# Patient Record
Sex: FEMALE | Race: ASIAN | NOT HISPANIC OR LATINO | URBAN - METROPOLITAN AREA
[De-identification: names, ages, dates, MRNs, and addresses within clinical notes are randomized per-mention and may not be internally consistent; named-entity substitution may affect disease eponyms.]

---

## 2017-06-06 DIAGNOSIS — G89.29 PAIN IN LEFT WRIST: ICD-10-CM

## 2017-06-06 DIAGNOSIS — M25.532 PAIN IN LEFT WRIST: ICD-10-CM

## 2017-06-14 ENCOUNTER — OUTPATIENT (OUTPATIENT)
Dept: OUTPATIENT SERVICES | Facility: HOSPITAL | Age: 27
LOS: 1 days | End: 2017-06-14
Payer: MEDICAID

## 2017-06-14 ENCOUNTER — APPOINTMENT (OUTPATIENT)
Dept: ORTHOPEDIC SURGERY | Facility: HOSPITAL | Age: 27
End: 2017-06-14

## 2017-06-14 ENCOUNTER — APPOINTMENT (OUTPATIENT)
Dept: RADIOLOGY | Facility: HOSPITAL | Age: 27
End: 2017-06-14

## 2017-06-14 VITALS
HEART RATE: 68 BPM | HEIGHT: 61 IN | SYSTOLIC BLOOD PRESSURE: 115 MMHG | DIASTOLIC BLOOD PRESSURE: 56 MMHG | WEIGHT: 125.88 LBS | BODY MASS INDEX: 23.77 KG/M2

## 2017-06-14 PROCEDURE — 73110 X-RAY EXAM OF WRIST: CPT | Mod: 26,LT

## 2017-06-15 DIAGNOSIS — M65.4 RADIAL STYLOID TENOSYNOVITIS [DE QUERVAIN]: ICD-10-CM

## 2017-07-19 ENCOUNTER — APPOINTMENT (OUTPATIENT)
Dept: ORTHOPEDIC SURGERY | Facility: HOSPITAL | Age: 27
End: 2017-07-19

## 2017-08-22 ENCOUNTER — RESULT REVIEW (OUTPATIENT)
Age: 27
End: 2017-08-22

## 2017-10-25 ENCOUNTER — RESULT REVIEW (OUTPATIENT)
Age: 27
End: 2017-10-25

## 2017-12-27 ENCOUNTER — OUTPATIENT (OUTPATIENT)
Dept: OUTPATIENT SERVICES | Facility: HOSPITAL | Age: 27
LOS: 1 days | End: 2017-12-27

## 2017-12-27 ENCOUNTER — APPOINTMENT (OUTPATIENT)
Dept: ORTHOPEDIC SURGERY | Facility: HOSPITAL | Age: 27
End: 2017-12-27

## 2017-12-27 VITALS
WEIGHT: 123 LBS | DIASTOLIC BLOOD PRESSURE: 68 MMHG | BODY MASS INDEX: 23.22 KG/M2 | SYSTOLIC BLOOD PRESSURE: 118 MMHG | HEART RATE: 67 BPM | HEIGHT: 61 IN

## 2017-12-27 DIAGNOSIS — M65.4 RADIAL STYLOID TENOSYNOVITIS [DE QUERVAIN]: ICD-10-CM

## 2018-07-24 ENCOUNTER — APPOINTMENT (OUTPATIENT)
Dept: ORTHOPEDIC SURGERY | Facility: CLINIC | Age: 28
End: 2018-07-24
Payer: MEDICAID

## 2018-07-24 VITALS
SYSTOLIC BLOOD PRESSURE: 89 MMHG | HEART RATE: 97 BPM | WEIGHT: 120 LBS | BODY MASS INDEX: 22.66 KG/M2 | HEIGHT: 61 IN | DIASTOLIC BLOOD PRESSURE: 64 MMHG

## 2018-07-24 PROCEDURE — 99203 OFFICE O/P NEW LOW 30 MIN: CPT

## 2018-08-14 ENCOUNTER — OUTPATIENT (OUTPATIENT)
Dept: OUTPATIENT SERVICES | Facility: HOSPITAL | Age: 28
LOS: 1 days | End: 2018-08-14
Payer: MEDICAID

## 2018-08-14 VITALS
SYSTOLIC BLOOD PRESSURE: 100 MMHG | RESPIRATION RATE: 16 BRPM | TEMPERATURE: 98 F | HEIGHT: 61 IN | DIASTOLIC BLOOD PRESSURE: 60 MMHG | HEART RATE: 71 BPM | WEIGHT: 119.93 LBS | OXYGEN SATURATION: 99 %

## 2018-08-14 DIAGNOSIS — M65.4 RADIAL STYLOID TENOSYNOVITIS [DE QUERVAIN]: ICD-10-CM

## 2018-08-14 DIAGNOSIS — Z01.818 ENCOUNTER FOR OTHER PREPROCEDURAL EXAMINATION: ICD-10-CM

## 2018-08-14 LAB
HCT VFR BLD CALC: 37.6 % — SIGNIFICANT CHANGE UP (ref 34.5–45)
HGB BLD-MCNC: 12.7 G/DL — SIGNIFICANT CHANGE UP (ref 11.5–15.5)
MCHC RBC-ENTMCNC: 28.7 PG — SIGNIFICANT CHANGE UP (ref 27–34)
MCHC RBC-ENTMCNC: 33.8 GM/DL — SIGNIFICANT CHANGE UP (ref 32–36)
MCV RBC AUTO: 85.1 FL — SIGNIFICANT CHANGE UP (ref 80–100)
PLATELET # BLD AUTO: 296 K/UL — SIGNIFICANT CHANGE UP (ref 150–400)
RBC # BLD: 4.42 M/UL — SIGNIFICANT CHANGE UP (ref 3.8–5.2)
RBC # FLD: 12.9 % — SIGNIFICANT CHANGE UP (ref 10.3–14.5)
WBC # BLD: 6.15 K/UL — SIGNIFICANT CHANGE UP (ref 3.8–10.5)
WBC # FLD AUTO: 6.15 K/UL — SIGNIFICANT CHANGE UP (ref 3.8–10.5)

## 2018-08-14 PROCEDURE — 85027 COMPLETE CBC AUTOMATED: CPT

## 2018-08-14 PROCEDURE — G0463: CPT

## 2018-08-14 RX ORDER — SODIUM CHLORIDE 9 MG/ML
3 INJECTION INTRAMUSCULAR; INTRAVENOUS; SUBCUTANEOUS EVERY 8 HOURS
Qty: 0 | Refills: 0 | Status: DISCONTINUED | OUTPATIENT
Start: 2018-08-17 | End: 2018-09-01

## 2018-08-14 RX ORDER — LIDOCAINE HCL 20 MG/ML
0.2 VIAL (ML) INJECTION ONCE
Qty: 0 | Refills: 0 | Status: DISCONTINUED | OUTPATIENT
Start: 2018-08-17 | End: 2018-09-01

## 2018-08-14 NOTE — H&P PST ADULT - PROBLEM SELECTOR PLAN 1
Left DeQuervains release ( Spoke to Dr Agee OR booking person Bernadette and she is going to check with Dr Agee , pt has a cyst on left wrist and pt wants it removed. Bernadette will call back with plan.   Check CBC, UCG DOS Left DeQuervains release ( Spoke to Dr Agee OR booking person Bernadette and she is going to check with Dr Agee , pt has a cyst on left wrist and pt wants it removed. Bernadette will call back with plan.   Bernadette spoke to Dr Agee and the DeQuervains release will also remove the cyst in pt left wrist.     Check CBC, UCG DOS

## 2018-08-14 NOTE — H&P PST ADULT - NSANTHOSAYNRD_GEN_A_CORE
No. SHARON screening performed.  STOP BANG Legend: 0-2 = LOW Risk; 3-4 = INTERMEDIATE Risk; 5-8 = HIGH Risk

## 2018-08-14 NOTE — H&P PST ADULT - HISTORY OF PRESENT ILLNESS
28 y/O female C/O left wrist pain since 6/2017, pt received 2 injections by Dr Colin  with relief for 6 months. Seen by MD. S/P x-ray left wrist.  Presents Left DeQuervain release 8/17/18.

## 2018-08-16 ENCOUNTER — TRANSCRIPTION ENCOUNTER (OUTPATIENT)
Age: 28
End: 2018-08-16

## 2018-08-16 RX ORDER — ONDANSETRON 8 MG/1
4 TABLET, FILM COATED ORAL ONCE
Qty: 0 | Refills: 0 | Status: DISCONTINUED | OUTPATIENT
Start: 2018-08-17 | End: 2018-09-01

## 2018-08-16 RX ORDER — SODIUM CHLORIDE 9 MG/ML
1000 INJECTION, SOLUTION INTRAVENOUS
Qty: 0 | Refills: 0 | Status: DISCONTINUED | OUTPATIENT
Start: 2018-08-17 | End: 2018-09-01

## 2018-08-17 ENCOUNTER — APPOINTMENT (OUTPATIENT)
Dept: ORTHOPEDIC SURGERY | Facility: HOSPITAL | Age: 28
End: 2018-08-17

## 2018-08-17 ENCOUNTER — OUTPATIENT (OUTPATIENT)
Dept: OUTPATIENT SERVICES | Facility: HOSPITAL | Age: 28
LOS: 1 days | End: 2018-08-17
Payer: MEDICAID

## 2018-08-17 VITALS
WEIGHT: 119.93 LBS | SYSTOLIC BLOOD PRESSURE: 107 MMHG | OXYGEN SATURATION: 100 % | HEIGHT: 60.98 IN | RESPIRATION RATE: 16 BRPM | DIASTOLIC BLOOD PRESSURE: 73 MMHG | HEART RATE: 81 BPM | TEMPERATURE: 98 F

## 2018-08-17 VITALS
TEMPERATURE: 98 F | HEART RATE: 74 BPM | OXYGEN SATURATION: 100 % | SYSTOLIC BLOOD PRESSURE: 106 MMHG | RESPIRATION RATE: 16 BRPM | DIASTOLIC BLOOD PRESSURE: 63 MMHG

## 2018-08-17 DIAGNOSIS — M65.4 RADIAL STYLOID TENOSYNOVITIS [DE QUERVAIN]: ICD-10-CM

## 2018-08-17 PROCEDURE — 25000 INCISION OF TENDON SHEATH: CPT | Mod: LT

## 2018-08-17 NOTE — ASU DISCHARGE PLAN (ADULT/PEDIATRIC). - NOTIFY
Bleeding that does not stop/Unable to Urinate/GYN Fever>100.4/Swelling that continues/Persistent Nausea and Vomiting

## 2018-08-28 ENCOUNTER — APPOINTMENT (OUTPATIENT)
Dept: ORTHOPEDIC SURGERY | Facility: CLINIC | Age: 28
End: 2018-08-28
Payer: MEDICAID

## 2018-08-28 PROCEDURE — 99024 POSTOP FOLLOW-UP VISIT: CPT

## 2021-03-05 PROBLEM — M65.4 RADIAL STYLOID TENOSYNOVITIS [DE QUERVAIN]: Chronic | Status: ACTIVE | Noted: 2018-08-14

## 2021-03-15 ENCOUNTER — LABORATORY RESULT (OUTPATIENT)
Age: 31
End: 2021-03-15

## 2021-03-15 ENCOUNTER — APPOINTMENT (OUTPATIENT)
Dept: OBGYN | Facility: CLINIC | Age: 31
End: 2021-03-15
Payer: MEDICAID

## 2021-03-15 ENCOUNTER — ASOB RESULT (OUTPATIENT)
Age: 31
End: 2021-03-15

## 2021-03-15 VITALS
TEMPERATURE: 97.6 F | WEIGHT: 113 LBS | BODY MASS INDEX: 21.34 KG/M2 | SYSTOLIC BLOOD PRESSURE: 110 MMHG | HEART RATE: 68 BPM | HEIGHT: 61 IN | DIASTOLIC BLOOD PRESSURE: 64 MMHG

## 2021-03-15 DIAGNOSIS — A74.9 CHLAMYDIAL INFECTION, UNSPECIFIED: ICD-10-CM

## 2021-03-15 DIAGNOSIS — Z80.41 FAMILY HISTORY OF MALIGNANT NEOPLASM OF OVARY: ICD-10-CM

## 2021-03-15 PROCEDURE — 76817 TRANSVAGINAL US OBSTETRIC: CPT

## 2021-03-15 PROCEDURE — 99072 ADDL SUPL MATRL&STAF TM PHE: CPT

## 2021-03-15 PROCEDURE — 99204 OFFICE O/P NEW MOD 45 MIN: CPT

## 2021-03-15 RX ORDER — HYDROCODONE BITARTRATE AND ACETAMINOPHEN 5; 325 MG/1; MG/1
5-325 TABLET ORAL
Qty: 5 | Refills: 0 | Status: DISCONTINUED | COMMUNITY
Start: 2018-08-14 | End: 2021-03-15

## 2021-03-16 LAB
ABO + RH PNL BLD: NORMAL
BASOPHILS # BLD AUTO: 0.04 K/UL
BASOPHILS NFR BLD AUTO: 2.1 %
BLD GP AB SCN SERPL QL: NORMAL
C TRACH RRNA SPEC QL NAA+PROBE: NOT DETECTED
COVID-19 NUCLEOCAPSID  GAM ANTIBODY INTERPRETATION: NEGATIVE
EOSINOPHIL # BLD AUTO: 0.08 K/UL
EOSINOPHIL NFR BLD AUTO: 4.2 %
HBV SURFACE AG SER QL: NONREACTIVE
HCT VFR BLD CALC: 36.1 %
HCV AB SER QL: NONREACTIVE
HCV S/CO RATIO: 0.06 S/CO
HGB A MFR BLD: 97.2 %
HGB A2 MFR BLD: 2.8 %
HGB BLD-MCNC: 11.6 G/DL
HGB FRACT BLD-IMP: NORMAL
HIV1+2 AB SPEC QL IA.RAPID: NONREACTIVE
IMM GRANULOCYTES NFR BLD AUTO: 0 %
LYMPHOCYTES # BLD AUTO: 0.75 K/UL
LYMPHOCYTES NFR BLD AUTO: 39.7 %
MAN DIFF?: NORMAL
MCHC RBC-ENTMCNC: 28.4 PG
MCHC RBC-ENTMCNC: 32.1 GM/DL
MCV RBC AUTO: 88.5 FL
MEV IGG FLD QL IA: 204 AU/ML
MEV IGG+IGM SER-IMP: POSITIVE
MONOCYTES # BLD AUTO: 0.36 K/UL
MONOCYTES NFR BLD AUTO: 19 %
N GONORRHOEA RRNA SPEC QL NAA+PROBE: NOT DETECTED
NEUTROPHILS # BLD AUTO: 0.66 K/UL
NEUTROPHILS NFR BLD AUTO: 35 %
PLATELET # BLD AUTO: 257 K/UL
RBC # BLD: 4.08 M/UL
RBC # FLD: 12.6 %
RUBV IGG FLD-ACNC: 1.1 INDEX
RUBV IGG SER-IMP: POSITIVE
SARS-COV-2 AB SERPL QL IA: 0.08 INDEX
SOURCE AMPLIFICATION: NORMAL
T GONDII AB SER-IMP: NEGATIVE
T GONDII AB SER-IMP: NEGATIVE
T GONDII IGG SER QL: <3 IU/ML
T GONDII IGM SER QL: <3 AU/ML
T PALLIDUM AB SER QL IA: NEGATIVE
VZV AB TITR SER: NEGATIVE
VZV IGG SER IF-ACNC: 72.1 INDEX
WBC # FLD AUTO: 1.89 K/UL

## 2021-03-17 LAB
B19V IGG SER QL IA: 8.46 INDEX
B19V IGG+IGM SER-IMP: NORMAL
B19V IGG+IGM SER-IMP: POSITIVE
B19V IGM FLD-ACNC: 0.12 INDEX
B19V IGM SER-ACNC: NEGATIVE
BACTERIA UR CULT: NORMAL
LEAD BLD-MCNC: <1 UG/DL

## 2021-03-20 LAB — AR GENE MUT ANL BLD/T: NORMAL

## 2021-03-23 LAB — FMR1 GENE MUT ANL BLD/T: NORMAL

## 2021-04-12 ENCOUNTER — APPOINTMENT (OUTPATIENT)
Dept: OBGYN | Facility: CLINIC | Age: 31
End: 2021-04-12
Payer: MEDICAID

## 2021-04-12 ENCOUNTER — NON-APPOINTMENT (OUTPATIENT)
Age: 31
End: 2021-04-12

## 2021-04-12 VITALS
TEMPERATURE: 97.3 F | HEIGHT: 61 IN | DIASTOLIC BLOOD PRESSURE: 66 MMHG | WEIGHT: 114 LBS | BODY MASS INDEX: 21.52 KG/M2 | SYSTOLIC BLOOD PRESSURE: 115 MMHG

## 2021-04-12 PROCEDURE — 0501F PRENATAL FLOW SHEET: CPT

## 2021-04-27 ENCOUNTER — NON-APPOINTMENT (OUTPATIENT)
Age: 31
End: 2021-04-27

## 2021-04-28 ENCOUNTER — LABORATORY RESULT (OUTPATIENT)
Age: 31
End: 2021-04-28

## 2021-04-28 ENCOUNTER — ASOB RESULT (OUTPATIENT)
Age: 31
End: 2021-04-28

## 2021-04-28 ENCOUNTER — APPOINTMENT (OUTPATIENT)
Dept: ANTEPARTUM | Facility: CLINIC | Age: 31
End: 2021-04-28
Payer: MEDICAID

## 2021-04-28 PROCEDURE — 76813 OB US NUCHAL MEAS 1 GEST: CPT

## 2021-04-28 PROCEDURE — 76801 OB US < 14 WKS SINGLE FETUS: CPT

## 2021-04-28 PROCEDURE — 99072 ADDL SUPL MATRL&STAF TM PHE: CPT

## 2021-04-28 PROCEDURE — 36416 COLLJ CAPILLARY BLOOD SPEC: CPT

## 2021-04-30 LAB
CFTR MUT TESTED BLD/T: NEGATIVE
CYTOLOGY CVX/VAG DOC THIN PREP: NORMAL

## 2021-05-03 ENCOUNTER — NON-APPOINTMENT (OUTPATIENT)
Age: 31
End: 2021-05-03

## 2021-05-04 ENCOUNTER — NON-APPOINTMENT (OUTPATIENT)
Age: 31
End: 2021-05-04

## 2021-05-06 ENCOUNTER — APPOINTMENT (OUTPATIENT)
Dept: OBGYN | Facility: CLINIC | Age: 31
End: 2021-05-06
Payer: MEDICAID

## 2021-05-06 ENCOUNTER — NON-APPOINTMENT (OUTPATIENT)
Age: 31
End: 2021-05-06

## 2021-05-06 PROCEDURE — XXXXX: CPT

## 2021-05-20 ENCOUNTER — NON-APPOINTMENT (OUTPATIENT)
Age: 31
End: 2021-05-20

## 2021-05-20 ENCOUNTER — APPOINTMENT (OUTPATIENT)
Dept: OBGYN | Facility: CLINIC | Age: 31
End: 2021-05-20
Payer: MEDICAID

## 2021-05-20 VITALS
BODY MASS INDEX: 22.47 KG/M2 | WEIGHT: 119 LBS | TEMPERATURE: 98 F | HEIGHT: 61 IN | SYSTOLIC BLOOD PRESSURE: 118 MMHG | DIASTOLIC BLOOD PRESSURE: 63 MMHG

## 2021-05-20 PROCEDURE — 0502F SUBSEQUENT PRENATAL CARE: CPT

## 2021-05-21 ENCOUNTER — NON-APPOINTMENT (OUTPATIENT)
Age: 31
End: 2021-05-21

## 2021-05-21 LAB
BASOPHILS # BLD AUTO: 0.05 K/UL
BASOPHILS NFR BLD AUTO: 0.6 %
EOSINOPHIL # BLD AUTO: 0.23 K/UL
EOSINOPHIL NFR BLD AUTO: 2.9 %
HCT VFR BLD CALC: 32.2 %
HGB BLD-MCNC: 10.9 G/DL
IMM GRANULOCYTES NFR BLD AUTO: 0.4 %
LYMPHOCYTES # BLD AUTO: 1.64 K/UL
LYMPHOCYTES NFR BLD AUTO: 20.3 %
MAN DIFF?: NORMAL
MCHC RBC-ENTMCNC: 29.6 PG
MCHC RBC-ENTMCNC: 33.9 GM/DL
MCV RBC AUTO: 87.5 FL
MONOCYTES # BLD AUTO: 0.47 K/UL
MONOCYTES NFR BLD AUTO: 5.8 %
NEUTROPHILS # BLD AUTO: 5.65 K/UL
NEUTROPHILS NFR BLD AUTO: 70 %
PLATELET # BLD AUTO: 302 K/UL
RBC # BLD: 3.68 M/UL
RBC # FLD: 12.7 %
WBC # FLD AUTO: 8.07 K/UL

## 2021-05-26 LAB
2ND TRIMESTER DATA: NORMAL
AFP PNL SERPL: NORMAL
AFP SERPL-ACNC: NORMAL
CLINICAL BIOCHEMIST REVIEW: NORMAL
NOTES NTD: NORMAL

## 2021-06-24 ENCOUNTER — APPOINTMENT (OUTPATIENT)
Dept: OBGYN | Facility: CLINIC | Age: 31
End: 2021-06-24
Payer: MEDICAID

## 2021-06-24 ENCOUNTER — APPOINTMENT (OUTPATIENT)
Dept: ANTEPARTUM | Facility: CLINIC | Age: 31
End: 2021-06-24
Payer: MEDICAID

## 2021-06-24 ENCOUNTER — NON-APPOINTMENT (OUTPATIENT)
Age: 31
End: 2021-06-24

## 2021-06-24 ENCOUNTER — ASOB RESULT (OUTPATIENT)
Age: 31
End: 2021-06-24

## 2021-06-24 VITALS
WEIGHT: 126.1 LBS | SYSTOLIC BLOOD PRESSURE: 98 MMHG | DIASTOLIC BLOOD PRESSURE: 63 MMHG | HEART RATE: 66 BPM | BODY MASS INDEX: 23.81 KG/M2 | HEIGHT: 61 IN

## 2021-06-24 VITALS
HEART RATE: 66 BPM | HEIGHT: 61 IN | DIASTOLIC BLOOD PRESSURE: 63 MMHG | SYSTOLIC BLOOD PRESSURE: 98 MMHG | WEIGHT: 66 LBS | BODY MASS INDEX: 12.46 KG/M2

## 2021-06-24 PROCEDURE — 76811 OB US DETAILED SNGL FETUS: CPT

## 2021-06-24 PROCEDURE — 0502F SUBSEQUENT PRENATAL CARE: CPT

## 2021-06-30 ENCOUNTER — NON-APPOINTMENT (OUTPATIENT)
Age: 31
End: 2021-06-30

## 2021-07-06 ENCOUNTER — APPOINTMENT (OUTPATIENT)
Dept: ANTEPARTUM | Facility: CLINIC | Age: 31
End: 2021-07-06

## 2021-07-12 ENCOUNTER — ASOB RESULT (OUTPATIENT)
Age: 31
End: 2021-07-12

## 2021-07-12 ENCOUNTER — APPOINTMENT (OUTPATIENT)
Dept: ANTEPARTUM | Facility: CLINIC | Age: 31
End: 2021-07-12
Payer: MEDICAID

## 2021-07-12 ENCOUNTER — NON-APPOINTMENT (OUTPATIENT)
Age: 31
End: 2021-07-12

## 2021-07-12 PROCEDURE — 76816 OB US FOLLOW-UP PER FETUS: CPT

## 2021-07-29 ENCOUNTER — APPOINTMENT (OUTPATIENT)
Dept: OBGYN | Facility: CLINIC | Age: 31
End: 2021-07-29
Payer: MEDICAID

## 2021-07-29 VITALS
DIASTOLIC BLOOD PRESSURE: 67 MMHG | HEART RATE: 73 BPM | SYSTOLIC BLOOD PRESSURE: 104 MMHG | HEIGHT: 61 IN | BODY MASS INDEX: 24.94 KG/M2 | WEIGHT: 132.1 LBS

## 2021-07-29 LAB
BASOPHILS # BLD AUTO: 0.05 K/UL
BASOPHILS NFR BLD AUTO: 0.6 %
EOSINOPHIL # BLD AUTO: 0.1 K/UL
EOSINOPHIL NFR BLD AUTO: 1.1 %
GLUCOSE 1H P 50 G GLC PO SERPL-MCNC: 122 MG/DL
HCT VFR BLD CALC: 34.7 %
HGB BLD-MCNC: 11.5 G/DL
IMM GRANULOCYTES NFR BLD AUTO: 0.5 %
LYMPHOCYTES # BLD AUTO: 1.21 K/UL
LYMPHOCYTES NFR BLD AUTO: 13.9 %
MAN DIFF?: NORMAL
MCHC RBC-ENTMCNC: 30.5 PG
MCHC RBC-ENTMCNC: 33.1 GM/DL
MCV RBC AUTO: 92 FL
MONOCYTES # BLD AUTO: 0.4 K/UL
MONOCYTES NFR BLD AUTO: 4.6 %
NEUTROPHILS # BLD AUTO: 6.93 K/UL
NEUTROPHILS NFR BLD AUTO: 79.3 %
PLATELET # BLD AUTO: 302 K/UL
RBC # BLD: 3.77 M/UL
RBC # FLD: 12.9 %
WBC # FLD AUTO: 8.73 K/UL

## 2021-07-29 PROCEDURE — 0502F SUBSEQUENT PRENATAL CARE: CPT

## 2021-08-01 LAB — T PALLIDUM AB SER QL IA: NEGATIVE

## 2021-08-04 ENCOUNTER — TRANSCRIPTION ENCOUNTER (OUTPATIENT)
Age: 31
End: 2021-08-04

## 2021-08-10 ENCOUNTER — APPOINTMENT (OUTPATIENT)
Dept: ORTHOPEDIC SURGERY | Facility: CLINIC | Age: 31
End: 2021-08-10
Payer: MEDICAID

## 2021-08-10 PROCEDURE — 99215 OFFICE O/P EST HI 40 MIN: CPT | Mod: 25

## 2021-08-10 PROCEDURE — 20550 NJX 1 TENDON SHEATH/LIGAMENT: CPT | Mod: RT

## 2021-08-11 NOTE — H&P PST ADULT - AS O2 DELIVERY
[facility], [address]  [address]  [facility] [id number] [phone]    8/11/2021    Hubert Dempsey  [address]  [facility] [address]    Dear @Hubert,    Our records indicate that you had a scheduled appointment on 8/11/21 with Nelsy Cordova MD for which you cancelled late or did not show for your appointment.    This is a reminder of [facility] missed appointment policy, which requires you to give at least 24 hour notice if you are unable to keep your appointment.  You may be billed the no show fee if allowable.     This letter is a reminder that after missing 3 appointments you may be discharged from Nelsy Cordova MD's care.    Please contact the clinic if you have any questions.     Please call me at [phone] to schedule a follow up appointment.If I do not hear from you by 9/11/21 (4 weeks from the date of this letter), I will assume you no longer desire my services and this episode of care will be closed. However, you may call at any time and we will be happy to reopen your care.    Sincerely,        [facility]        
  [facility], [address]  [address]  [facility] [id number] [phone]    8/11/2021    Hubert Dempsey  [address]  [facility] [address]    Dear Hubert,    Our records indicate that you had a scheduled appointment on 8/11/2021 with Nelsy Cordova MD for which you cancelled late or did not show for your appointment.    This is a reminder of [facility]'s missed appointment policy, which requires you to give at least 24 hour notice if you are unable to keep your appointment. You may be billed the no show fee if allowable.     This letter is a reminder that after missing 3 appointments you may be discharged from Nelsy Cordova MD's care.    Please contact the clinic if you have any questions.     Please call me at [phone] to schedule a follow up appointment. If I do not hear from you by 9/11/21 (4 weeks from the date of this letter), I will assume you no longer desire my services and this episode of care will be closed. However, you may call at any time and we will be happy to reopen your care.    Sincerely,        [facility]        
room air

## 2021-08-14 ENCOUNTER — NON-APPOINTMENT (OUTPATIENT)
Age: 31
End: 2021-08-14

## 2021-08-26 ENCOUNTER — APPOINTMENT (OUTPATIENT)
Dept: OBGYN | Facility: CLINIC | Age: 31
End: 2021-08-26
Payer: MEDICAID

## 2021-08-26 VITALS
HEART RATE: 96 BPM | WEIGHT: 137 LBS | SYSTOLIC BLOOD PRESSURE: 95 MMHG | BODY MASS INDEX: 25.86 KG/M2 | DIASTOLIC BLOOD PRESSURE: 61 MMHG | HEIGHT: 61 IN

## 2021-08-26 PROCEDURE — 0502F SUBSEQUENT PRENATAL CARE: CPT

## 2021-09-09 ENCOUNTER — NON-APPOINTMENT (OUTPATIENT)
Age: 31
End: 2021-09-09

## 2021-09-09 ENCOUNTER — APPOINTMENT (OUTPATIENT)
Dept: OBGYN | Facility: CLINIC | Age: 31
End: 2021-09-09
Payer: MEDICAID

## 2021-09-09 VITALS
HEART RATE: 97 BPM | HEIGHT: 61 IN | DIASTOLIC BLOOD PRESSURE: 64 MMHG | BODY MASS INDEX: 26.7 KG/M2 | WEIGHT: 141.4 LBS | SYSTOLIC BLOOD PRESSURE: 100 MMHG

## 2021-09-09 DIAGNOSIS — Z23 ENCOUNTER FOR IMMUNIZATION: ICD-10-CM

## 2021-09-09 PROCEDURE — 90471 IMMUNIZATION ADMIN: CPT

## 2021-09-09 PROCEDURE — 90715 TDAP VACCINE 7 YRS/> IM: CPT

## 2021-09-09 PROCEDURE — 0502F SUBSEQUENT PRENATAL CARE: CPT

## 2021-09-17 ENCOUNTER — ASOB RESULT (OUTPATIENT)
Age: 31
End: 2021-09-17

## 2021-09-17 ENCOUNTER — APPOINTMENT (OUTPATIENT)
Dept: ANTEPARTUM | Facility: CLINIC | Age: 31
End: 2021-09-17
Payer: MEDICAID

## 2021-09-17 PROCEDURE — 76816 OB US FOLLOW-UP PER FETUS: CPT

## 2021-09-17 PROCEDURE — 76819 FETAL BIOPHYS PROFIL W/O NST: CPT

## 2021-09-23 ENCOUNTER — APPOINTMENT (OUTPATIENT)
Dept: OBGYN | Facility: CLINIC | Age: 31
End: 2021-09-23
Payer: MEDICAID

## 2021-09-23 VITALS
BODY MASS INDEX: 26.67 KG/M2 | SYSTOLIC BLOOD PRESSURE: 100 MMHG | HEIGHT: 61 IN | TEMPERATURE: 95.5 F | WEIGHT: 141.25 LBS | DIASTOLIC BLOOD PRESSURE: 61 MMHG

## 2021-09-23 PROCEDURE — 0502F SUBSEQUENT PRENATAL CARE: CPT

## 2021-09-24 ENCOUNTER — NON-APPOINTMENT (OUTPATIENT)
Age: 31
End: 2021-09-24

## 2021-09-28 ENCOUNTER — NON-APPOINTMENT (OUTPATIENT)
Age: 31
End: 2021-09-28

## 2021-10-04 ENCOUNTER — APPOINTMENT (OUTPATIENT)
Dept: OBGYN | Facility: CLINIC | Age: 31
End: 2021-10-04
Payer: MEDICAID

## 2021-10-04 VITALS
BODY MASS INDEX: 26.81 KG/M2 | SYSTOLIC BLOOD PRESSURE: 100 MMHG | HEIGHT: 61 IN | DIASTOLIC BLOOD PRESSURE: 69 MMHG | TEMPERATURE: 97 F | WEIGHT: 142 LBS

## 2021-10-04 PROCEDURE — 90686 IIV4 VACC NO PRSV 0.5 ML IM: CPT

## 2021-10-04 PROCEDURE — 90471 IMMUNIZATION ADMIN: CPT

## 2021-10-04 PROCEDURE — 90472 IMMUNIZATION ADMIN EACH ADD: CPT

## 2021-10-04 PROCEDURE — 90715 TDAP VACCINE 7 YRS/> IM: CPT

## 2021-10-04 PROCEDURE — 0502F SUBSEQUENT PRENATAL CARE: CPT

## 2021-10-11 ENCOUNTER — APPOINTMENT (OUTPATIENT)
Dept: OBGYN | Facility: CLINIC | Age: 31
End: 2021-10-11
Payer: MEDICAID

## 2021-10-11 VITALS
WEIGHT: 138.31 LBS | BODY MASS INDEX: 26.11 KG/M2 | SYSTOLIC BLOOD PRESSURE: 107 MMHG | DIASTOLIC BLOOD PRESSURE: 66 MMHG | HEART RATE: 85 BPM | HEIGHT: 61 IN

## 2021-10-11 PROCEDURE — 0502F SUBSEQUENT PRENATAL CARE: CPT

## 2021-10-13 LAB
GP B STREP DNA SPEC QL NAA+PROBE: NORMAL
GP B STREP DNA SPEC QL NAA+PROBE: NOT DETECTED
SOURCE GBS: NORMAL

## 2021-10-18 ENCOUNTER — APPOINTMENT (OUTPATIENT)
Dept: OBGYN | Facility: CLINIC | Age: 31
End: 2021-10-18
Payer: MEDICAID

## 2021-10-18 VITALS
SYSTOLIC BLOOD PRESSURE: 112 MMHG | HEART RATE: 93 BPM | HEIGHT: 61 IN | WEIGHT: 148.19 LBS | BODY MASS INDEX: 27.98 KG/M2 | DIASTOLIC BLOOD PRESSURE: 70 MMHG

## 2021-10-18 PROCEDURE — 0502F SUBSEQUENT PRENATAL CARE: CPT

## 2021-10-25 ENCOUNTER — APPOINTMENT (OUTPATIENT)
Dept: OBGYN | Facility: CLINIC | Age: 31
End: 2021-10-25
Payer: MEDICAID

## 2021-10-25 VITALS
WEIGHT: 146 LBS | SYSTOLIC BLOOD PRESSURE: 91 MMHG | BODY MASS INDEX: 27.56 KG/M2 | HEIGHT: 61 IN | DIASTOLIC BLOOD PRESSURE: 63 MMHG | HEART RATE: 99 BPM | TEMPERATURE: 96.9 F

## 2021-10-25 PROCEDURE — 0502F SUBSEQUENT PRENATAL CARE: CPT

## 2021-11-01 ENCOUNTER — APPOINTMENT (OUTPATIENT)
Dept: OBGYN | Facility: CLINIC | Age: 31
End: 2021-11-01
Payer: MEDICAID

## 2021-11-01 VITALS
BODY MASS INDEX: 27.94 KG/M2 | SYSTOLIC BLOOD PRESSURE: 105 MMHG | WEIGHT: 148 LBS | DIASTOLIC BLOOD PRESSURE: 72 MMHG | HEIGHT: 61 IN | HEART RATE: 89 BPM

## 2021-11-01 PROCEDURE — 0502F SUBSEQUENT PRENATAL CARE: CPT

## 2021-11-08 ENCOUNTER — APPOINTMENT (OUTPATIENT)
Dept: OBGYN | Facility: CLINIC | Age: 31
End: 2021-11-08
Payer: MEDICAID

## 2021-11-08 VITALS
BODY MASS INDEX: 28.51 KG/M2 | DIASTOLIC BLOOD PRESSURE: 72 MMHG | HEIGHT: 61 IN | TEMPERATURE: 97.2 F | HEART RATE: 102 BPM | WEIGHT: 151 LBS | SYSTOLIC BLOOD PRESSURE: 103 MMHG

## 2021-11-08 PROCEDURE — 0502F SUBSEQUENT PRENATAL CARE: CPT

## 2021-11-11 ENCOUNTER — OUTPATIENT (OUTPATIENT)
Dept: OUTPATIENT SERVICES | Facility: HOSPITAL | Age: 31
LOS: 1 days | End: 2021-11-11

## 2021-11-11 ENCOUNTER — APPOINTMENT (OUTPATIENT)
Dept: ANTEPARTUM | Facility: HOSPITAL | Age: 31
End: 2021-11-11

## 2021-11-11 ENCOUNTER — APPOINTMENT (OUTPATIENT)
Dept: ANTEPARTUM | Facility: CLINIC | Age: 31
End: 2021-11-11
Payer: MEDICAID

## 2021-11-11 ENCOUNTER — ASOB RESULT (OUTPATIENT)
Age: 31
End: 2021-11-11

## 2021-11-11 PROCEDURE — 76818 FETAL BIOPHYS PROFILE W/NST: CPT | Mod: 26

## 2021-11-11 PROCEDURE — 76816 OB US FOLLOW-UP PER FETUS: CPT

## 2021-11-16 ENCOUNTER — APPOINTMENT (OUTPATIENT)
Dept: ANTEPARTUM | Facility: CLINIC | Age: 31
End: 2021-11-16
Payer: MEDICAID

## 2021-11-16 ENCOUNTER — ASOB RESULT (OUTPATIENT)
Age: 31
End: 2021-11-16

## 2021-11-16 ENCOUNTER — OUTPATIENT (OUTPATIENT)
Dept: OUTPATIENT SERVICES | Facility: HOSPITAL | Age: 31
LOS: 1 days | End: 2021-11-16

## 2021-11-16 ENCOUNTER — APPOINTMENT (OUTPATIENT)
Dept: ANTEPARTUM | Facility: HOSPITAL | Age: 31
End: 2021-11-16

## 2021-11-16 ENCOUNTER — INPATIENT (INPATIENT)
Facility: HOSPITAL | Age: 31
LOS: 5 days | Discharge: HOME | End: 2021-11-22
Attending: OBSTETRICS & GYNECOLOGY | Admitting: OBSTETRICS & GYNECOLOGY
Payer: MEDICAID

## 2021-11-16 VITALS — RESPIRATION RATE: 18 BRPM | DIASTOLIC BLOOD PRESSURE: 58 MMHG | HEART RATE: 91 BPM | SYSTOLIC BLOOD PRESSURE: 113 MMHG

## 2021-11-16 DIAGNOSIS — Z3A.00 WEEKS OF GESTATION OF PREGNANCY NOT SPECIFIED: ICD-10-CM

## 2021-11-16 DIAGNOSIS — O26.899 OTHER SPECIFIED PREGNANCY RELATED CONDITIONS, UNSPECIFIED TRIMESTER: ICD-10-CM

## 2021-11-16 DIAGNOSIS — O48.0 POST-TERM PREGNANCY: ICD-10-CM

## 2021-11-16 LAB
BASOPHILS # BLD AUTO: 0.02 K/UL — SIGNIFICANT CHANGE UP (ref 0–0.2)
BASOPHILS NFR BLD AUTO: 0.3 % — SIGNIFICANT CHANGE UP (ref 0–2)
EOSINOPHIL # BLD AUTO: 0.06 K/UL — SIGNIFICANT CHANGE UP (ref 0–0.5)
EOSINOPHIL NFR BLD AUTO: 0.8 % — SIGNIFICANT CHANGE UP (ref 0–6)
HCT VFR BLD CALC: 34.7 % — SIGNIFICANT CHANGE UP (ref 34.5–45)
HGB BLD-MCNC: 12.4 G/DL — SIGNIFICANT CHANGE UP (ref 11.5–15.5)
IANC: 5.82 K/UL — SIGNIFICANT CHANGE UP (ref 1.5–8.5)
IMM GRANULOCYTES NFR BLD AUTO: 0.3 % — SIGNIFICANT CHANGE UP (ref 0–1.5)
LYMPHOCYTES # BLD AUTO: 1.35 K/UL — SIGNIFICANT CHANGE UP (ref 1–3.3)
LYMPHOCYTES # BLD AUTO: 17.6 % — SIGNIFICANT CHANGE UP (ref 13–44)
MCHC RBC-ENTMCNC: 32.2 PG — SIGNIFICANT CHANGE UP (ref 27–34)
MCHC RBC-ENTMCNC: 35.7 GM/DL — SIGNIFICANT CHANGE UP (ref 32–36)
MCV RBC AUTO: 90.1 FL — SIGNIFICANT CHANGE UP (ref 80–100)
MONOCYTES # BLD AUTO: 0.4 K/UL — SIGNIFICANT CHANGE UP (ref 0–0.9)
MONOCYTES NFR BLD AUTO: 5.2 % — SIGNIFICANT CHANGE UP (ref 2–14)
NEUTROPHILS # BLD AUTO: 5.82 K/UL — SIGNIFICANT CHANGE UP (ref 1.8–7.4)
NEUTROPHILS NFR BLD AUTO: 75.8 % — SIGNIFICANT CHANGE UP (ref 43–77)
NRBC # BLD: 0 /100 WBCS — SIGNIFICANT CHANGE UP
NRBC # FLD: 0 K/UL — SIGNIFICANT CHANGE UP
PLATELET # BLD AUTO: 270 K/UL — SIGNIFICANT CHANGE UP (ref 150–400)
RBC # BLD: 3.85 M/UL — SIGNIFICANT CHANGE UP (ref 3.8–5.2)
RBC # FLD: 12.7 % — SIGNIFICANT CHANGE UP (ref 10.3–14.5)
WBC # BLD: 7.67 K/UL — SIGNIFICANT CHANGE UP (ref 3.8–10.5)
WBC # FLD AUTO: 7.67 K/UL — SIGNIFICANT CHANGE UP (ref 3.8–10.5)

## 2021-11-16 PROCEDURE — 76818 FETAL BIOPHYS PROFILE W/NST: CPT | Mod: 26

## 2021-11-16 RX ORDER — CITRIC ACID/SODIUM CITRATE 300-500 MG
15 SOLUTION, ORAL ORAL EVERY 6 HOURS
Refills: 0 | Status: DISCONTINUED | OUTPATIENT
Start: 2021-11-16 | End: 2021-11-20

## 2021-11-16 RX ORDER — SODIUM CHLORIDE 9 MG/ML
1000 INJECTION, SOLUTION INTRAVENOUS
Refills: 0 | Status: DISCONTINUED | OUTPATIENT
Start: 2021-11-16 | End: 2021-11-20

## 2021-11-16 RX ORDER — OXYTOCIN 10 UNIT/ML
333.33 VIAL (ML) INJECTION
Qty: 20 | Refills: 0 | Status: DISCONTINUED | OUTPATIENT
Start: 2021-11-16 | End: 2021-11-22

## 2021-11-16 NOTE — OB RN PATIENT PROFILE - NS_CONTACTNUMBEROFSUPPORTPERSON_OBGYN_ALL_OB_FT
If you are a smoker, it is important for your health to stop smoking. Please be aware that second hand smoke is also harmful.
[phone]

## 2021-11-16 NOTE — OB RN PATIENT PROFILE - NSICDXFAMILYHX_GEN_ALL_CORE_FT
FAMILY HISTORY:  Grandparent  Still living? Yes, Estimated age: 71-80  Family history of diabetes mellitus in maternal grandmother, Age at diagnosis: Age Unknown

## 2021-11-17 LAB
BLD GP AB SCN SERPL QL: NEGATIVE — SIGNIFICANT CHANGE UP
COVID-19 SPIKE DOMAIN AB INTERP: POSITIVE
COVID-19 SPIKE DOMAIN ANTIBODY RESULT: >250 U/ML — HIGH
RH IG SCN BLD-IMP: POSITIVE — SIGNIFICANT CHANGE UP
RH IG SCN BLD-IMP: POSITIVE — SIGNIFICANT CHANGE UP
SARS-COV-2 IGG+IGM SERPL QL IA: >250 U/ML — HIGH
SARS-COV-2 IGG+IGM SERPL QL IA: POSITIVE
SARS-COV-2 RNA SPEC QL NAA+PROBE: SIGNIFICANT CHANGE UP
T PALLIDUM AB TITR SER: NEGATIVE — SIGNIFICANT CHANGE UP

## 2021-11-17 RX ORDER — BUTORPHANOL TARTRATE 2 MG/ML
2 INJECTION, SOLUTION INTRAMUSCULAR; INTRAVENOUS ONCE
Refills: 0 | Status: DISCONTINUED | OUTPATIENT
Start: 2021-11-17 | End: 2021-11-17

## 2021-11-17 RX ADMIN — SODIUM CHLORIDE 125 MILLILITER(S): 9 INJECTION, SOLUTION INTRAVENOUS at 01:07

## 2021-11-17 RX ADMIN — BUTORPHANOL TARTRATE 2 MILLIGRAM(S): 2 INJECTION, SOLUTION INTRAMUSCULAR; INTRAVENOUS at 07:42

## 2021-11-17 RX ADMIN — SODIUM CHLORIDE 125 MILLILITER(S): 9 INJECTION, SOLUTION INTRAVENOUS at 08:52

## 2021-11-17 RX ADMIN — BUTORPHANOL TARTRATE 2 MILLIGRAM(S): 2 INJECTION, SOLUTION INTRAMUSCULAR; INTRAVENOUS at 05:59

## 2021-11-17 NOTE — OB PROVIDER H&P - NSHPPHYSICALEXAM_GEN_ALL_CORE
T(C): 36.8 (11-16-21 @ 22:22), Max: 36.8 (11-16-21 @ 22:22)  HR: 93 (11-17-21 @ 00:19) (81 - 100)  BP: 113/58 (11-16-21 @ 22:22) (113/58 - 113/58)  RR: 18 (11-16-21 @ 22:22) (18 - 18)  SpO2: 98% (11-17-21 @ 00:19) (97% - 99%)  Gen: NAD, well-appearing   Abd: Soft, gravid  SVE:  1/50/-3  Bedside sono: vertex  FHT: 140/mod desiree/+acels/-decels Cat I  Circle: irregular

## 2021-11-17 NOTE — OB PROVIDER H&P - HISTORY OF PRESENT ILLNESS
31y  at 40.6 weeks GA presents to L&D for LT IOL. Patient denies vaginal bleeding, contractions and leakage of fluid. She endorses good fetal movement. Denies fevers, chills, nausea and vomiting. No other complaints at this time. Prenatal course uncomplicated.   JAZZY:2021      POB:   PGYN: hx of incidental ovarian cysts. 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

## 2021-11-17 NOTE — OB PROVIDER H&P - ASSESSMENT
A/P: 30 y/o  @40.6 IOL for LT.   -Admit to L&D  -Consent  -Admission labs  -NPO, except ice chips   -IV fluids  -Labor: Intact. 4PO  -Fetus: Cat I tracing. Continuous toco and fetal monitoring.   -GBS: Negative, no GBS ppx required   -Analgesia: epi PRN  -DVT ppx: Ambulate and SCD's while in bed     Discussed with Dr. Gracy Weber, PGY1    A/P: 32 y/o  @40.6 IOL for LT.   -Admit to L&D  -Consent  -Admission labs  -NPO, except ice chips   -IV fluids  -Labor: Intact. 4PO and CB if tolerated  -Fetus: Cat I tracing. Continuous toco and fetal monitoring.   -GBS: Negative, no GBS ppx required   -Analgesia: epi PRN  -DVT ppx: Ambulate and SCD's while in bed     Discussed with Dr. Junior Weber, PGY1

## 2021-11-18 DIAGNOSIS — O48.0 POST-TERM PREGNANCY: ICD-10-CM

## 2021-11-18 RX ORDER — OXYTOCIN 10 UNIT/ML
1 VIAL (ML) INJECTION
Qty: 30 | Refills: 0 | Status: DISCONTINUED | OUTPATIENT
Start: 2021-11-18 | End: 2021-11-19

## 2021-11-18 RX ORDER — ONDANSETRON 8 MG/1
4 TABLET, FILM COATED ORAL ONCE
Refills: 0 | Status: COMPLETED | OUTPATIENT
Start: 2021-11-18 | End: 2021-11-18

## 2021-11-18 RX ADMIN — SODIUM CHLORIDE 125 MILLILITER(S): 9 INJECTION, SOLUTION INTRAVENOUS at 13:31

## 2021-11-18 RX ADMIN — Medication 1 MILLIUNIT(S)/MIN: at 15:45

## 2021-11-18 RX ADMIN — ONDANSETRON 4 MILLIGRAM(S): 8 TABLET, FILM COATED ORAL at 21:29

## 2021-11-19 ENCOUNTER — TRANSCRIPTION ENCOUNTER (OUTPATIENT)
Age: 31
End: 2021-11-19

## 2021-11-19 LAB
BLD GP AB SCN SERPL QL: NEGATIVE — SIGNIFICANT CHANGE UP
RH IG SCN BLD-IMP: POSITIVE — SIGNIFICANT CHANGE UP

## 2021-11-19 RX ORDER — OXYTOCIN 10 UNIT/ML
14 VIAL (ML) INJECTION
Qty: 30 | Refills: 0 | Status: DISCONTINUED | OUTPATIENT
Start: 2021-11-19 | End: 2021-11-20

## 2021-11-19 RX ORDER — AMPICILLIN TRIHYDRATE 250 MG
2 CAPSULE ORAL ONCE
Refills: 0 | Status: COMPLETED | OUTPATIENT
Start: 2021-11-19 | End: 2021-11-19

## 2021-11-19 RX ORDER — AMPICILLIN TRIHYDRATE 250 MG
CAPSULE ORAL
Refills: 0 | Status: DISCONTINUED | OUTPATIENT
Start: 2021-11-19 | End: 2021-11-20

## 2021-11-19 RX ORDER — AMPICILLIN TRIHYDRATE 250 MG
1 CAPSULE ORAL EVERY 4 HOURS
Refills: 0 | Status: DISCONTINUED | OUTPATIENT
Start: 2021-11-19 | End: 2021-11-20

## 2021-11-19 RX ORDER — SODIUM CHLORIDE 9 MG/ML
1000 INJECTION INTRAMUSCULAR; INTRAVENOUS; SUBCUTANEOUS
Refills: 0 | Status: DISCONTINUED | OUTPATIENT
Start: 2021-11-19 | End: 2021-11-20

## 2021-11-19 RX ORDER — SODIUM CHLORIDE 9 MG/ML
300 INJECTION INTRAMUSCULAR; INTRAVENOUS; SUBCUTANEOUS ONCE
Refills: 0 | Status: COMPLETED | OUTPATIENT
Start: 2021-11-19 | End: 2021-11-19

## 2021-11-19 RX ORDER — OXYTOCIN 10 UNIT/ML
2 VIAL (ML) INJECTION
Qty: 30 | Refills: 0 | Status: DISCONTINUED | OUTPATIENT
Start: 2021-11-19 | End: 2021-11-20

## 2021-11-19 RX ADMIN — Medication 14 MILLIUNIT(S)/MIN: at 22:40

## 2021-11-19 RX ADMIN — Medication 208 GRAM(S): at 21:17

## 2021-11-19 RX ADMIN — SODIUM CHLORIDE 600 MILLILITER(S): 9 INJECTION INTRAMUSCULAR; INTRAVENOUS; SUBCUTANEOUS at 05:52

## 2021-11-19 RX ADMIN — Medication 0.25 MILLIGRAM(S): at 06:09

## 2021-11-19 RX ADMIN — Medication 208 GRAM(S): at 17:13

## 2021-11-19 RX ADMIN — Medication 2 MILLIUNIT(S)/MIN: at 16:53

## 2021-11-19 RX ADMIN — SODIUM CHLORIDE 125 MILLILITER(S): 9 INJECTION INTRAMUSCULAR; INTRAVENOUS; SUBCUTANEOUS at 06:00

## 2021-11-19 RX ADMIN — Medication 1 MILLIUNIT(S)/MIN: at 07:41

## 2021-11-19 RX ADMIN — Medication 216 GRAM(S): at 13:20

## 2021-11-19 NOTE — OB PROVIDER LABOR PROGRESS NOTE - NSVAGINALEXAM_OBGYN_ALL_OB_DT
18-Nov-2021 04:45
17-Nov-2021 04:32
19-Nov-2021 06:14
18-Nov-2021 07:20
18-Nov-2021 15:02
19-Nov-2021 23:15

## 2021-11-19 NOTE — OB PROVIDER LABOR PROGRESS NOTE - NS_OBIHIFHRDETAILS_OBGYN_ALL_OB_FT
130/mod desiree/+ accels/no decels
Cat I
130/mod/+accel/-decel
125/mod/+accel/-decel
135, mod desiree, + accels, infreq late decels
120, mod desiree, +accels, repetitive late decels

## 2021-11-19 NOTE — OB PROVIDER LABOR PROGRESS NOTE - NS_SUBJECTIVE/OBJECTIVE_OBGYN_ALL_OB_FT
PGY4 Labor Note    Patient seen and evaluated at bedside.  Denies complaints.  Comfortable w/ epidural.      T(C): 37.1 (11-19-21 @ 22:59), Max: 37.6 (11-19-21 @ 17:14)  HR: 105 (11-19-21 @ 23:48) (68 - 124)  BP: 119/77 (11-19-21 @ 23:36) (94/63 - 138/63)  RR: 17 (11-19-21 @ 19:45) (17 - 19)  SpO2: 100% (11-19-21 @ 23:43) (88% - 100%)
Pt seen for cervical evaluation sp epidural. Pt comfortable.
Pt seen for cervical evaluation. Comfortable with epidural.
R3 OB Chart Note    CB removed.    Vital Signs Last 24 Hrs  T(C): 36.6 (18 Nov 2021 01:36), Max: 36.8 (17 Nov 2021 09:30)  T(F): 97.88 (18 Nov 2021 01:36), Max: 98.24 (17 Nov 2021 09:30)  HR: 66 (18 Nov 2021 04:40) (59 - 104)  BP: 118/65 (18 Nov 2021 01:36) (101/60 - 121/61)  RR: 18 (17 Nov 2021 13:30) (18 - 18)  SpO2: 98% (18 Nov 2021 04:40) (91% - 99%)
PGY4 Labor Note    Patient seen and evaluated at bedside for decel. Denies complaints.  Comfortable w/ epidural.      T(C): 36.9 (11-19-21 @ 02:51), Max: 37.4 (11-18-21 @ 20:45)  HR: 90 (11-19-21 @ 06:02) (55 - 125)  BP: 126/93 (11-19-21 @ 06:02) (90/51 - 138/63)  RR: 17 (11-19-21 @ 02:51) (16 - 19)  SpO2: 95% (11-19-21 @ 05:32) (90% - 100%)
R3 OB Chart Note    Pt checked for placement of CB.    Vital Signs Last 24 Hrs  T(C): 37.0 (17 Nov 2021 01:45), Max: 37.0 (17 Nov 2021 01:45)  T(F): 98.6 (17 Nov 2021 01:45), Max: 98.6 (17 Nov 2021 01:45)  HR: 93 (17 Nov 2021 04:24) (75 - 115)  BP: 107/59 (17 Nov 2021 01:45) (107/59 - 113/58)  RR: 18 (16 Nov 2021 22:22) (18 - 18)  SpO2: 96% (17 Nov 2021 04:14) (93% - 99%)

## 2021-11-19 NOTE — OB PROVIDER LABOR PROGRESS NOTE - ASSESSMENT
A/P: 31y P0 admitted for IOL for dates     - Labor: spPO/VC/CB, on pit, s/p ROM, protracted labor course. patient with good effort on pushing, brought head down to nearly 0 station, however unable to reduce lip appropriately to continue pushing  - Fetus: overall reassuring presently, tolerating pushing well  - GBS: neg  - Analgesia: epi in place    Position to facilitate elimination of anterior lip  Discussed possible need for C/S if cervix does not dilate fully  Given category 1 tracing presently will increase pitocin and re-eval    GABRIEL Cottrell PGY4  d/w Dr. Martinez, in house
A/P: 31y P0 admitted for IOL for dates  - Labor: spPO/CB/VC, pit@345p, AROM@7p  - Fetus: category 2 with return to baseline s/p terb  - GBS: neg  - Analgesia: epi in place    GABRIEL Cottrell PGY4  Patient seen and counseled with Dr. Pacheco
IOL LT  - s/p PO  - CB removed  - 4epi  - VE/VC    d/w Dr. Camille Cervantes R3
 vaginal cytotec not placed  For pitocin  DW Dr Estephania gillette, NP
IOL late term  - c/w PO  - CB 60/60 cc sterile saline in uterine/vaginal balloons placed w/o incident    per plan prev d/w Dr. Junior Hogue R3
32 y/o  @41w LTIOL sp PO/CB    vaginal cytotec #1 placed without incident   Re-evaluate in 3 hours  DW Dr Estephania gillette, NP

## 2021-11-20 PROCEDURE — 59510 CESAREAN DELIVERY: CPT | Mod: U9,UB,GC

## 2021-11-20 RX ORDER — FAMOTIDINE 10 MG/ML
20 INJECTION INTRAVENOUS ONCE
Refills: 0 | Status: COMPLETED | OUTPATIENT
Start: 2021-11-20 | End: 2021-11-20

## 2021-11-20 RX ORDER — NALOXONE HYDROCHLORIDE 4 MG/.1ML
0.1 SPRAY NASAL
Refills: 0 | Status: DISCONTINUED | OUTPATIENT
Start: 2021-11-20 | End: 2021-11-22

## 2021-11-20 RX ORDER — NALBUPHINE HYDROCHLORIDE 10 MG/ML
2.5 INJECTION, SOLUTION INTRAMUSCULAR; INTRAVENOUS; SUBCUTANEOUS EVERY 6 HOURS
Refills: 0 | Status: DISCONTINUED | OUTPATIENT
Start: 2021-11-20 | End: 2021-11-22

## 2021-11-20 RX ORDER — IBUPROFEN 200 MG
600 TABLET ORAL EVERY 6 HOURS
Refills: 0 | Status: COMPLETED | OUTPATIENT
Start: 2021-11-20 | End: 2022-10-19

## 2021-11-20 RX ORDER — HEPARIN SODIUM 5000 [USP'U]/ML
5000 INJECTION INTRAVENOUS; SUBCUTANEOUS EVERY 12 HOURS
Refills: 0 | Status: DISCONTINUED | OUTPATIENT
Start: 2021-11-20 | End: 2021-11-22

## 2021-11-20 RX ORDER — OXYCODONE HYDROCHLORIDE 5 MG/1
5 TABLET ORAL ONCE
Refills: 0 | Status: DISCONTINUED | OUTPATIENT
Start: 2021-11-20 | End: 2021-11-22

## 2021-11-20 RX ORDER — KETOROLAC TROMETHAMINE 30 MG/ML
30 SYRINGE (ML) INJECTION EVERY 6 HOURS
Refills: 0 | Status: COMPLETED | OUTPATIENT
Start: 2021-11-20 | End: 2021-11-22

## 2021-11-20 RX ORDER — DEXAMETHASONE 0.5 MG/5ML
4 ELIXIR ORAL EVERY 6 HOURS
Refills: 0 | Status: DISCONTINUED | OUTPATIENT
Start: 2021-11-20 | End: 2021-11-22

## 2021-11-20 RX ORDER — LANOLIN
1 OINTMENT (GRAM) TOPICAL EVERY 6 HOURS
Refills: 0 | Status: DISCONTINUED | OUTPATIENT
Start: 2021-11-20 | End: 2021-11-22

## 2021-11-20 RX ORDER — DIPHENHYDRAMINE HCL 50 MG
25 CAPSULE ORAL EVERY 6 HOURS
Refills: 0 | Status: DISCONTINUED | OUTPATIENT
Start: 2021-11-20 | End: 2021-11-22

## 2021-11-20 RX ORDER — METOCLOPRAMIDE HCL 10 MG
10 TABLET ORAL ONCE
Refills: 0 | Status: COMPLETED | OUTPATIENT
Start: 2021-11-20 | End: 2021-11-20

## 2021-11-20 RX ORDER — CITRIC ACID/SODIUM CITRATE 300-500 MG
30 SOLUTION, ORAL ORAL ONCE
Refills: 0 | Status: COMPLETED | OUTPATIENT
Start: 2021-11-20 | End: 2021-11-20

## 2021-11-20 RX ORDER — OXYTOCIN 10 UNIT/ML
333.33 VIAL (ML) INJECTION
Qty: 20 | Refills: 0 | Status: DISCONTINUED | OUTPATIENT
Start: 2021-11-20 | End: 2021-11-22

## 2021-11-20 RX ORDER — DIPHENHYDRAMINE HCL 50 MG
25 CAPSULE ORAL ONCE
Refills: 0 | Status: COMPLETED | OUTPATIENT
Start: 2021-11-20 | End: 2021-11-20

## 2021-11-20 RX ORDER — TETANUS TOXOID, REDUCED DIPHTHERIA TOXOID AND ACELLULAR PERTUSSIS VACCINE, ADSORBED 5; 2.5; 8; 8; 2.5 [IU]/.5ML; [IU]/.5ML; UG/.5ML; UG/.5ML; UG/.5ML
0.5 SUSPENSION INTRAMUSCULAR ONCE
Refills: 0 | Status: DISCONTINUED | OUTPATIENT
Start: 2021-11-20 | End: 2021-11-22

## 2021-11-20 RX ORDER — ONDANSETRON 8 MG/1
4 TABLET, FILM COATED ORAL EVERY 6 HOURS
Refills: 0 | Status: DISCONTINUED | OUTPATIENT
Start: 2021-11-20 | End: 2021-11-22

## 2021-11-20 RX ORDER — OXYCODONE HYDROCHLORIDE 5 MG/1
5 TABLET ORAL
Refills: 0 | Status: COMPLETED | OUTPATIENT
Start: 2021-11-20 | End: 2021-11-27

## 2021-11-20 RX ORDER — SIMETHICONE 80 MG/1
80 TABLET, CHEWABLE ORAL EVERY 4 HOURS
Refills: 0 | Status: DISCONTINUED | OUTPATIENT
Start: 2021-11-20 | End: 2021-11-22

## 2021-11-20 RX ORDER — MAGNESIUM HYDROXIDE 400 MG/1
30 TABLET, CHEWABLE ORAL
Refills: 0 | Status: DISCONTINUED | OUTPATIENT
Start: 2021-11-20 | End: 2021-11-22

## 2021-11-20 RX ORDER — SODIUM CHLORIDE 9 MG/ML
1000 INJECTION, SOLUTION INTRAVENOUS
Refills: 0 | Status: DISCONTINUED | OUTPATIENT
Start: 2021-11-20 | End: 2021-11-22

## 2021-11-20 RX ADMIN — Medication 30 MILLILITER(S): at 02:11

## 2021-11-20 RX ADMIN — HEPARIN SODIUM 5000 UNIT(S): 5000 INJECTION INTRAVENOUS; SUBCUTANEOUS at 12:08

## 2021-11-20 RX ADMIN — HEPARIN SODIUM 5000 UNIT(S): 5000 INJECTION INTRAVENOUS; SUBCUTANEOUS at 23:41

## 2021-11-20 RX ADMIN — Medication 30 MILLIGRAM(S): at 12:08

## 2021-11-20 RX ADMIN — Medication 30 MILLIGRAM(S): at 13:01

## 2021-11-20 RX ADMIN — Medication 30 MILLIGRAM(S): at 18:09

## 2021-11-20 RX ADMIN — Medication 10 MILLIGRAM(S): at 01:26

## 2021-11-20 RX ADMIN — Medication 208 GRAM(S): at 01:19

## 2021-11-20 RX ADMIN — Medication 25 MILLIGRAM(S): at 01:31

## 2021-11-20 RX ADMIN — FAMOTIDINE 20 MILLIGRAM(S): 10 INJECTION INTRAVENOUS at 01:16

## 2021-11-20 RX ADMIN — Medication 30 MILLIGRAM(S): at 17:22

## 2021-11-20 RX ADMIN — SODIUM CHLORIDE 75 MILLILITER(S): 9 INJECTION, SOLUTION INTRAVENOUS at 06:35

## 2021-11-20 RX ADMIN — Medication 30 MILLIGRAM(S): at 23:56

## 2021-11-20 RX ADMIN — Medication 1000 MILLIUNIT(S)/MIN: at 06:35

## 2021-11-20 RX ADMIN — Medication 30 MILLIGRAM(S): at 23:41

## 2021-11-20 NOTE — DISCHARGE NOTE OB - HOSPITAL COURSE
Pt admitted for induction of labor due to late term.  She had a protracted labor course and underwent a primary C/S for arrest of dilation at 9cm.  Viable female infant. Pt admitted for induction of labor due to late term.  She had a protracted labor course and underwent a primary C/S for arrest of dilation at 9cm.  Viable female infant.  Uncomplicated postop course.

## 2021-11-20 NOTE — OB RN DELIVERY SUMMARY - NS_SEPSISRSKCALC_OBGYN_ALL_OB_FT
EOS calculated successfully. EOS Risk Factor: 0.5/1000 live births ([facility] national incidence); GA=41w2d; Temp=99.68; ROM=31.833; GBS='Negative'; Antibiotics='GBS specific antibiotics > 2 hrs prior to birth'

## 2021-11-20 NOTE — OB RN PREOPERATIVE CHECKLIST - ADVANCE DIRECTIVE ADDRESSED/READDRESSED
Control De Herida:Sin Complicaciones [Wound Check: No Complication]  [place] laceración (laceration [cortada]) está sanando cosme kermit se esperaba.  Cuidados En [place]:  1) Mantenga la herida limpia y seca.  2) Si le colocaron sweetie venda, puede cambiarla sweetie vez al día haciendo lo siguiente:  -- Después de quitar la venda, lave la alice con agua y jabón. Emplee un hisopo de algodón (Q tip) para aflojar y quitar la des o la costra que pueda andrew sobre la herida.  -- Después de limpiar la herida, coloque sweetie capa silvestre de pomada (ointment) Neosporin o Bacitracin. De sully modo, la herida quedará limpia y así será más fácil quitar los puntos. Coloque sweetie venda nueva.  -- Puede quitarse la venda para ducharse de la manera habitual después de 24 horas, marquise no sumerja (no empape) la alice de la herida (no puede ir a nadar) hasta que le hayan quitado los puntos.  -- Si le colocaron cinta autoadhesiva Steri-Strips, mantenga la alice limpia y seca. Si se humedece, séquela con sweetie toalla.  Visita De Control:  Si le colocaron suturas, es importante que mantenga la ginger que le programaron para retirárselas (suture removal). Si quedan puestas demasiado tiempo, las suturas pueden dejar marcas permanentes. Si le colocaron cinta autoadhesiva (surgical tape [“Steri-Strips”]), puede sacársela usted mismo después de stefanie stiven.  Busque Prontamente Atención Médica  si algo de lo siguiente ocurre:  Dolor que aumenta en la alice de la herida.  · Hinchazón, enrojecimiento o pus que supura de la herida.  · Fiebre de 100.4°F (38°C) o más tiffany, o kermit le haya indicado conway proveedor de atención médica.  · Si los bordes de la herida vuelven a abrirse.  Date Last Reviewed: 8/24/2015  © 0086-7474 [employer]. [address] [place] [address], [place] [address]. Todos los derechos reservados. Esta información no pretende sustituir la atención médica profesional. Sólo [place]way médico puede diagnosticar y tratar un problema de yahaira.         done

## 2021-11-20 NOTE — OB RN DELIVERY SUMMARY - NSSELHIDDEN_OBGYN_ALL_OB_FT
[NS_DeliveryAttending1_OBGYN_ALL_OB_FT:[id number]],[NS_DeliveryRN_OBGYN_ALL_OB_FT:[id number]],[NS_DeliveryAssist1_OBGYN_ALL_OB_FT:[id number]]

## 2021-11-20 NOTE — DISCHARGE NOTE OB - PATIENT PORTAL LINK FT
You can access the FollowMyHealth Patient Portal offered by [facility] by registering at the following website: [email or web address]. By joining [employer]’s FollowMyHealth portal, you will also be able to view your health information using other applications (apps) compatible with our system.

## 2021-11-20 NOTE — CHART NOTE - NSCHARTNOTEFT_GEN_A_CORE
OB Attg  Patient examined, VE: 4/80/-3  AROM clear fluid, patient vomiting  Contractions irregular. Continue augmentation of labor  Dasia Pacheco MD
OB Attg  Pt without new c/o  , pos accels moderate variability, no decels except when pushed x 1  IUPC q 2-4min  VE unchanged--ant lip, not able to reduce, position--believed to be OA  Arrest of dilation   will proceed with primary C/S  R/A/B of procedure d/w pt including but not limited to infection, bleeding and injury to bladder/bowel. Pt expressed understanding of above
Patient comfortable  FHT category 1  VE 7.5/90/-1  will restart pitocin since not adequate
OB Attg  Patient examined, VE: 4/80/-3, unchanged  IUPC placed. Cat 1 FHT, contractions q2-3 min  Continue to titrate pitocin  Dasia Pacheco MD
OB Attg--late entry  Pt feels some pressure  , pos accels, mod variability, no decels  IUPC q 2-3min  VE ant lip/-1  Cont pitocin
PA note    patient seen & examined for replacement of IUPC    VS  T(C): 37.0 (11-19-21 @ 12:40)  HR: 108 (11-19-21 @ 14:59)  BP: 129/65 (11-19-21 @ 14:50)  RR: 19 (11-19-21 @ 06:58)  SpO2: 97% (11-19-21 @ 14:32)    /mod desiree/+accels/no decels  [place] q 3-4min unclear pattern with IUPC in place   VE 7-8/90/-1 IUPC replaced    cont efm/toco  cont to titrate pitocin  will dw Dr Michelle grimes pa

## 2021-11-20 NOTE — DISCHARGE NOTE OB - CARE PROVIDER_API CALL
Nhi Fry (MD)  Obstetrics and Gynecology  [facility] [place] [address]  Phone: [phone]  Fax: [phone]  Follow Up Time:

## 2021-11-20 NOTE — DISCHARGE NOTE OB - CARE PLAN
1 Principal Discharge DX:	 delivery delivered  Assessment and plan of treatment:	Nothing per vagina and no heavy lifting for 6wks

## 2021-11-20 NOTE — OB RN DELIVERY SUMMARY - AS DELIV COMPLICATIONS OB
abnormal active phase labor/abnormal fetal heart rate tracing/abnormal second phase labor/nuchal cord/meconium stained fluid

## 2021-11-20 NOTE — DISCHARGE NOTE OB - MEDICATION SUMMARY - MEDICATIONS TO TAKE
I will START or STAY ON the medications listed below when I get home from the hospital:    ibuprofen 600 mg oral tablet  -- 1 tab(s) by mouth every 6 hours, As Needed  -- Indication: For Pain

## 2021-11-20 NOTE — OB PROVIDER DELIVERY SUMMARY - NSPROVIDERDELIVERYNOTE_OBGYN_ALL_OB_FT
primary LTCS for arrest of dilation  viable female infant, vertex presentation, nuchal x1, Apgars 8/9, cord gasses sent  grossly normal fallopian tubes and ovaries  posterior uterus with endometriotic implants  uterus closed in 2 layers with vicryl  mild atony improved with massage, additional pitocin, methergine  TXA also given  geraldine placed over hysterotomy site    QBL: 824   IVF: 1500  UOP: 400    GABRIEL Cottrell PGY4  w/ Dr. Martinez primary LTCS for arrest of dilation  viable female infant, vertex presentation, nuchal x1, Apgars 8/9, cord gasses sent  grossly normal fallopian tubes and ovaries  uterus closed in 2 layers with vicryl  mild atony improved with massage, additional pitocin, methergine  TXA also given  posterior uterus with endometriotic implants, geraldine placed    QBL: 824   IVF: 1500  UOP: 400    CJazmin Cottrell PGY4  w/ Dr. Martinez

## 2021-11-20 NOTE — OB PROVIDER DELIVERY SUMMARY - NSSELHIDDEN_OBGYN_ALL_OB_FT
[NS_DeliveryAttending1_OBGYN_ALL_OB_FT:[id number]],[NS_DeliveryRN_OBGYN_ALL_OB_FT:[id number]],[NS_DeliveryAssist1_OBGYN_ALL_OB_FT:[id number]]

## 2021-11-20 NOTE — OB NEONATOLOGY/PEDIATRICIAN DELIVERY SUMMARY - NSPEDSNEONOTESA_OBGYN_ALL_OB_FT
Pediatrics called for category II fetal heart tracing and meconium. This is a 41&6 wk  female born via c/s to a 30 y/o  mother. Mom was induced for postdates. No significant maternal or prenatal pmh.  Maternal labs include blood type A+ , HIV - , RPR -, Hep B [-], GBS negative on 10/11 (); ampicillinx4 given.  COVID negative on . AROM 32 hours prior to derlivery with meconium. Baby emerged vigorous, crying, was w/d/s/s with APGARS of 8/9. Nuchalx1.  Mom plans to initiate breastfeeding, consents Hep B vaccine. EOS 0.25. No maternal fevers. At 12 minutes of life, patient began to show signs of increased work of breathing with grunting and retractions. CPAP with max setting of 5/40% was given until 20 minutes of life at which point patient tolerated wean off. Pediatrics called for category II fetal heart tracing and meconium. This is a 41&2 wk  female born via c/s to a 30 y/o  mother. Mom was induced for postdates. No significant maternal or prenatal pmh.  Maternal labs include blood type A+ , HIV - , RPR -, Hep B [-], GBS negative on 10/11 (); ampicillinx4 given.  COVID negative on . AROM 32 hours prior to derlivery with meconium. Baby emerged vigorous, crying, was w/d/s/s with APGARS of 8/9. Nuchalx1.  Mom plans to initiate breastfeeding, consents Hep B vaccine. EOS 0.25. No maternal fevers. At 12 minutes of life, patient began to show signs of increased work of breathing with grunting and retractions. CPAP with max setting of 5/40% was given until 20 minutes of life at which point patient tolerated wean off. Pediatrics called for category II fetal heart tracing and meconium. This is a 41&2 wk  female born via c/s to a 28 y/o  mother. Mom was induced for postdates. No significant maternal or prenatal pmh.  Maternal labs include blood type A+ , HIV - , RPR -, Hep B [-], GBS negative on 10/11 (); ampicillinx4 given.  COVID negative on . AROM 32 hours prior to delivery with meconium. Baby emerged vigorous, crying, was w/d/s/s with APGARS of 8/9. Nuchalx1.  Mom plans to initiate breastfeeding, consents Hep B vaccine. EOS 0.25. No maternal fevers. At 12 minutes of life, patient began to show signs of increased work of breathing with grunting and retractions. CPAP with max setting of 5/40% was given until 20 minutes of life at which point patient tolerated wean off.

## 2021-11-21 LAB
BASOPHILS # BLD AUTO: 0.03 K/UL — SIGNIFICANT CHANGE UP (ref 0–0.2)
BASOPHILS NFR BLD AUTO: 0.2 % — SIGNIFICANT CHANGE UP (ref 0–2)
EOSINOPHIL # BLD AUTO: 0.07 K/UL — SIGNIFICANT CHANGE UP (ref 0–0.5)
EOSINOPHIL NFR BLD AUTO: 0.4 % — SIGNIFICANT CHANGE UP (ref 0–6)
HCT VFR BLD CALC: 25.5 % — LOW (ref 34.5–45)
HGB BLD-MCNC: 9.2 G/DL — LOW (ref 11.5–15.5)
IANC: 14.15 K/UL — HIGH (ref 1.5–8.5)
IMM GRANULOCYTES NFR BLD AUTO: 0.4 % — SIGNIFICANT CHANGE UP (ref 0–1.5)
LYMPHOCYTES # BLD AUTO: 1.28 K/UL — SIGNIFICANT CHANGE UP (ref 1–3.3)
LYMPHOCYTES # BLD AUTO: 7.8 % — LOW (ref 13–44)
MCHC RBC-ENTMCNC: 31.3 PG — SIGNIFICANT CHANGE UP (ref 27–34)
MCHC RBC-ENTMCNC: 36.1 GM/DL — HIGH (ref 32–36)
MCV RBC AUTO: 86.7 FL — SIGNIFICANT CHANGE UP (ref 80–100)
MONOCYTES # BLD AUTO: 0.74 K/UL — SIGNIFICANT CHANGE UP (ref 0–0.9)
MONOCYTES NFR BLD AUTO: 4.5 % — SIGNIFICANT CHANGE UP (ref 2–14)
NEUTROPHILS # BLD AUTO: 14.15 K/UL — HIGH (ref 1.8–7.4)
NEUTROPHILS NFR BLD AUTO: 86.7 % — HIGH (ref 43–77)
NRBC # BLD: 0 /100 WBCS — SIGNIFICANT CHANGE UP
NRBC # FLD: 0 K/UL — SIGNIFICANT CHANGE UP
PLATELET # BLD AUTO: 220 K/UL — SIGNIFICANT CHANGE UP (ref 150–400)
RBC # BLD: 2.94 M/UL — LOW (ref 3.8–5.2)
RBC # FLD: 12.5 % — SIGNIFICANT CHANGE UP (ref 10.3–14.5)
WBC # BLD: 16.34 K/UL — HIGH (ref 3.8–10.5)
WBC # FLD AUTO: 16.34 K/UL — HIGH (ref 3.8–10.5)

## 2021-11-21 RX ORDER — IBUPROFEN 200 MG
600 TABLET ORAL EVERY 6 HOURS
Refills: 0 | Status: DISCONTINUED | OUTPATIENT
Start: 2021-11-21 | End: 2021-11-22

## 2021-11-21 RX ORDER — OXYCODONE HYDROCHLORIDE 5 MG/1
5 TABLET ORAL
Refills: 0 | Status: DISCONTINUED | OUTPATIENT
Start: 2021-11-21 | End: 2021-11-22

## 2021-11-21 RX ADMIN — Medication 30 MILLIGRAM(S): at 05:40

## 2021-11-21 RX ADMIN — Medication 600 MILLIGRAM(S): at 17:27

## 2021-11-21 RX ADMIN — HEPARIN SODIUM 5000 UNIT(S): 5000 INJECTION INTRAVENOUS; SUBCUTANEOUS at 11:44

## 2021-11-21 RX ADMIN — HEPARIN SODIUM 5000 UNIT(S): 5000 INJECTION INTRAVENOUS; SUBCUTANEOUS at 23:48

## 2021-11-21 RX ADMIN — Medication 600 MILLIGRAM(S): at 11:43

## 2021-11-21 RX ADMIN — Medication 600 MILLIGRAM(S): at 12:41

## 2021-11-21 RX ADMIN — Medication 600 MILLIGRAM(S): at 18:06

## 2021-11-21 RX ADMIN — Medication 600 MILLIGRAM(S): at 23:34

## 2021-11-21 RX ADMIN — Medication 30 MILLIGRAM(S): at 05:25

## 2021-11-21 NOTE — LACTATION INITIAL EVALUATION - INTERVENTION OUTCOME
Infant sleep spoon fed 1/2 tsp. colostrum then able to latch with short burst to breast, needed stimulation to stay awake, encouraged mom to always get deep latch for effective milk transfer. Mom able to demonstrate and verbalize understanding of education through teach back./verbalizes understanding/demonstrates understanding of teaching/good return demonstration/needs met

## 2021-11-21 NOTE — PROGRESS NOTE ADULT - ASSESSMENT
A/P: 31y POD#1 s/p LTCS.  Patient is stable and doing well post-operatively.    - Continue regular diet.  - Increase ambulation.  - Continue motrin, tylenol, oxycodone PRN for pain control.  - Hct 25.5, 34.7 on 11/16    Adeel Doyle, PGY-1  Ob/Gyn

## 2021-11-22 VITALS
SYSTOLIC BLOOD PRESSURE: 118 MMHG | DIASTOLIC BLOOD PRESSURE: 54 MMHG | RESPIRATION RATE: 18 BRPM | TEMPERATURE: 98 F | OXYGEN SATURATION: 98 % | HEART RATE: 90 BPM

## 2021-11-22 RX ORDER — IBUPROFEN 200 MG
1 TABLET ORAL
Qty: 0 | Refills: 0 | DISCHARGE
Start: 2021-11-22

## 2021-11-22 RX ADMIN — MAGNESIUM HYDROXIDE 30 MILLILITER(S): 400 TABLET, CHEWABLE ORAL at 02:56

## 2021-11-22 RX ADMIN — Medication 600 MILLIGRAM(S): at 13:00

## 2021-11-22 RX ADMIN — Medication 600 MILLIGRAM(S): at 06:42

## 2021-11-22 RX ADMIN — MAGNESIUM HYDROXIDE 30 MILLILITER(S): 400 TABLET, CHEWABLE ORAL at 09:12

## 2021-11-22 RX ADMIN — SIMETHICONE 80 MILLIGRAM(S): 80 TABLET, CHEWABLE ORAL at 02:56

## 2021-11-22 RX ADMIN — Medication 600 MILLIGRAM(S): at 12:11

## 2021-11-22 RX ADMIN — Medication 600 MILLIGRAM(S): at 00:18

## 2021-11-22 RX ADMIN — Medication 600 MILLIGRAM(S): at 05:56

## 2021-11-22 RX ADMIN — HEPARIN SODIUM 5000 UNIT(S): 5000 INJECTION INTRAVENOUS; SUBCUTANEOUS at 12:10

## 2021-11-22 NOTE — PROGRESS NOTE ADULT - ATTENDING COMMENTS
Pt denies c/o except for b/l LE edema.  She feels well otherwise. Desires to go home  Inc--intact  POD#2--d/c home

## 2021-11-22 NOTE — PROGRESS NOTE ADULT - SUBJECTIVE AND OBJECTIVE BOX
Anesthesia paged for "leaking epidural". Upon assessment of pt, sitting in bed comfortably feeding baby. Epidural puncture site clean, non-tender, no crepitus with very mild leakage upon pressing on back. Pressure dressing placed. Patient moving all extremities. Denies any change in ROM. Denies any back pain. 
OB Progress Note: pTLCS, POD#2    S: 32yo POD#2 s/p pLTCS. Pain is well controlled. She is tolerating a regular diet and passing flatus. She is voiding spontaneously, and ambulating without difficulty. Denies CP/SOB. Denies lightheadedness/dizziness. Denies N/V.    O:  Vitals:  Vital Signs Last 24 Hrs  T(C): 36.4 (22 Nov 2021 05:45), Max: 36.4 (21 Nov 2021 13:15)  T(F): 97.6 (22 Nov 2021 05:45), Max: 97.6 (21 Nov 2021 13:15)  HR: 76 (22 Nov 2021 05:45) (69 - 81)  BP: 104/59 (22 Nov 2021 05:45) (95/52 - 104/59)  BP(mean): --  RR: 18 (22 Nov 2021 05:45) (17 - 18)  SpO2: 96% (22 Nov 2021 05:45) (96% - 99%)    MEDICATIONS  (STANDING):  diphtheria/tetanus/pertussis (acellular) Vaccine (ADAcel) 0.5 milliLiter(s) IntraMuscular once  heparin   Injectable 5000 Unit(s) SubCutaneous every 12 hours  ibuprofen  Tablet. 600 milliGRAM(s) Oral every 6 hours  lactated ringers. 1000 milliLiter(s) (75 mL/Hr) IV Continuous <Continuous>  oxytocin Infusion 333.333 milliUNIT(s)/Min (1000 mL/Hr) IV Continuous <Continuous>  oxytocin Infusion 333.333 milliUNIT(s)/Min (1000 mL/Hr) IV Continuous <Continuous>      MEDICATIONS  (PRN):  dexAMETHasone  Injectable 4 milliGRAM(s) IV Push every 6 hours PRN Nausea  diphenhydrAMINE 25 milliGRAM(s) Oral every 6 hours PRN Pruritus  lanolin Ointment 1 Application(s) Topical every 6 hours PRN Sore Nipples  magnesium hydroxide Suspension 30 milliLiter(s) Oral two times a day PRN Constipation  nalbuphine Injectable 2.5 milliGRAM(s) IV Push every 6 hours PRN Pruritus  naloxone Injectable 0.1 milliGRAM(s) IV Push every 3 minutes PRN For ANY of the following changes in patient status:  A. Breaths Per Minute LESS THAN 10, B. Oxygen saturation LESS THAN 90%, C. Sedation score of 6 for Stop After: 4 Times  ondansetron Injectable 4 milliGRAM(s) IV Push every 6 hours PRN Nausea  oxyCODONE    IR 5 milliGRAM(s) Oral once PRN Moderate to Severe Pain (4-10)  oxyCODONE    IR 5 milliGRAM(s) Oral every 3 hours PRN Moderate to Severe Pain (4-10)  simethicone 80 milliGRAM(s) Chew every 4 hours PRN Gas      Labs:  Blood type: A Positive  Rubella IgG: RPR: Negative                          9.2<L>   16.34<H> >-----------< 220    ( 11-21 @ 05:59 )             25.5<L>      PE:  General: NAD  Abdomen: Soft, appropriately tender, incision c/d/i.  Extremities: No erythema, no pitting edema    A/P: 32yo  POD#2 s/p pLTCS.  Patient is stable and doing well post-operatively.    - Continue regular diet.  - Increase ambulation.  - Continue motrin, tylenol, oxycodone PRN for pain control.  -Reviewed pain management regiment  -Bleeding precautions  -Reviewed signs and symptoms related to infection   -Nothing per vagina x6 weeks   -Report to OB in 6 weeks or sooner if indicated   -Patient teary eyed at bedside, reports that delivery process was overwhelming; comfort provided, patient reassured  --SW made aware  -All questions answered, verbalized understanding of teaching     ROSIE Felton NP
Pain Service Follow-up  Postop Day  1    S/P  C- Section    T(C): 36.6 (11-21-21 @ 05:20), Max: 36.7 (11-20-21 @ 14:52)  HR: 68 (11-21-21 @ 05:20) (63 - 87)  BP: 97/50 (11-21-21 @ 05:20) (84/50 - 97/50)  RR: 17 (11-21-21 @ 05:20) (16 - 17)  SpO2: 98% (11-21-21 @ 05:20) (98% - 99%)  Wt(kg): --      THERAPY:     S/P Epidural Morphine    Sedation Score:	  [X] Alert	      [  ] Drowsy       [  ] Arousable	[  ] Asleep         [  ] Unresponsive    Side Effects:	  [X] None	      [  ] Nausea       [  ] Pruritus        [  ] Weakness   [  ] Numbness        ASSESSMENT/ PLAN   [ X ] Discontinue         [  ] Continue    [ X ] Documentation and Verification of current medications       Satisfactory Post Anesthetic Course    
OB Progress Note:  Delivery, POD#1    S: 31y POD#1 s/p LTCS. Pain controlled. Tolerating a regular diet and passing flatus. Denies n/v, chest pain, shortness of breath,  or lightheadedness/dizziness. Voiding spontaneously and ambulating w/o difficulty    O:   Vital Signs Last 24 Hrs  T(C): 36.6 (2021 05:20), Max: 36.7 (2021 14:52)  T(F): 97.9 (2021 05:20), Max: 98.1 (2021 14:52)  HR: 68 (2021 05:20) (63 - 87)  BP: 97/50 (2021 05:20) (84/50 - 111/71)  BP(mean): --  RR: 17 (2021 05:20) (16 - 17)  SpO2: 98% (2021 05:20) (95% - 99%)    Labs:  Blood type: A Positive  Rubella IgG: RPR: Negative                          9.2<L>   16.34<H> >-----------< 220    ( 11-21 @ 05:59 )             25.5<L>          PE:  General: No acute distress  Pulm: Unlabored breathing. No respiratory distress  Abdomen: Soft. Non-tender. incision c/d/i   Extremities: No calf tenderness bilaterally

## 2021-11-24 ENCOUNTER — NON-APPOINTMENT (OUTPATIENT)
Age: 31
End: 2021-11-24

## 2021-11-26 ENCOUNTER — NON-APPOINTMENT (OUTPATIENT)
Age: 31
End: 2021-11-26

## 2022-01-07 ENCOUNTER — TRANSCRIPTION ENCOUNTER (OUTPATIENT)
Age: 32
End: 2022-01-07

## 2022-01-10 ENCOUNTER — APPOINTMENT (OUTPATIENT)
Dept: OBGYN | Facility: CLINIC | Age: 32
End: 2022-01-10
Payer: MEDICAID

## 2022-01-10 VITALS
SYSTOLIC BLOOD PRESSURE: 104 MMHG | HEIGHT: 61 IN | DIASTOLIC BLOOD PRESSURE: 69 MMHG | BODY MASS INDEX: 23.98 KG/M2 | WEIGHT: 127 LBS | HEART RATE: 78 BPM

## 2022-01-10 PROCEDURE — 0503F POSTPARTUM CARE VISIT: CPT

## 2022-01-31 DIAGNOSIS — Z3A.40 40 WEEKS GESTATION OF PREGNANCY: ICD-10-CM

## 2022-01-31 DIAGNOSIS — O28.1 ABNORMAL BIOCHEMICAL FINDING ON ANTENATAL SCREENING OF MOTHER: ICD-10-CM

## 2022-01-31 DIAGNOSIS — O48.0 POST-TERM PREGNANCY: ICD-10-CM

## 2022-02-01 NOTE — HISTORY OF PRESENT ILLNESS
[Postpartum Follow Up] : postpartum follow up [Delivery Date: ___] : on [unfilled] [Primary C/S] : delivered by  section [Female] : Delivery History: baby girl [Pertussis Vaccine] : Pertussis vaccine administered [Girl] : baby is a girl [___ Lbs] : [unfilled] lbs [___ Oz] : [unfilled] oz [Living at Home] : is currently living at home [Clean/Dry/Intact] : clean, dry and intact [Doing Well] : is doing well [No Sign of Infection] : is showing no signs of infection [Complications:___] : no complications [Last Pap Date: ___] : Last Pap Date: [unfilled] [Rhogam] : Rhogam was not administered [Rubella Vaccine] : Rubella vaccine was not administered [BTL] : no tubal ligation [Breastfeeding] : not currently nursing [Resumed Menses] : has not resumed her menses [Resumed [place]] : has not resumed intercourse [Intended Contraception] : Intended Contraception: [Condoms] : condoms [S/Sx PP Depression] : no signs/symptoms of postpartum depression [Erythema] : not erythematous [Back to Normal] : is back to normal in size [Mild] : mild vaginal bleeding [Normal] : the vagina was normal [Cervix Sample Taken] : cervical sample not taken for a Pap smear [Not Done] : Examination of breasts not done [Excellent Pain Control] : has excellent pain control [None] : None [de-identified] : Patient reports she has stopped bleeding.  [de-identified] : Patient wants to use condoms as contraception.

## 2022-02-03 DIAGNOSIS — O28.1 ABNORMAL BIOCHEMICAL FINDING ON ANTENATAL SCREENING OF MOTHER: ICD-10-CM

## 2022-02-03 DIAGNOSIS — O48.0 POST-TERM PREGNANCY: ICD-10-CM

## 2022-02-03 DIAGNOSIS — Z3A.41 41 WEEKS GESTATION OF PREGNANCY: ICD-10-CM

## 2022-03-14 ENCOUNTER — APPOINTMENT (OUTPATIENT)
Dept: ORTHOPEDIC SURGERY | Facility: CLINIC | Age: 32
End: 2022-03-14

## 2022-03-16 ENCOUNTER — NON-APPOINTMENT (OUTPATIENT)
Age: 32
End: 2022-03-16

## 2022-03-18 ENCOUNTER — NON-APPOINTMENT (OUTPATIENT)
Age: 32
End: 2022-03-18

## 2022-04-07 ENCOUNTER — APPOINTMENT (OUTPATIENT)
Dept: ORTHOPEDIC SURGERY | Facility: CLINIC | Age: 32
End: 2022-04-07
Payer: MEDICAID

## 2022-04-07 PROCEDURE — 99214 OFFICE O/P EST MOD 30 MIN: CPT | Mod: 25

## 2022-04-07 PROCEDURE — 20550 NJX 1 TENDON SHEATH/LIGAMENT: CPT | Mod: RT

## 2022-05-03 ENCOUNTER — APPOINTMENT (OUTPATIENT)
Dept: ORTHOPEDIC SURGERY | Facility: CLINIC | Age: 32
End: 2022-05-03
Payer: MEDICAID

## 2022-05-03 VITALS
WEIGHT: 127 LBS | HEIGHT: 61 IN | OXYGEN SATURATION: 96 % | DIASTOLIC BLOOD PRESSURE: 60 MMHG | BODY MASS INDEX: 23.98 KG/M2 | HEART RATE: 78 BPM | SYSTOLIC BLOOD PRESSURE: 92 MMHG

## 2022-05-03 DIAGNOSIS — M65.4 RADIAL STYLOID TENOSYNOVITIS [DE QUERVAIN]: ICD-10-CM

## 2022-05-03 PROCEDURE — 25000 INCISION OF TENDON SHEATH: CPT | Mod: RT

## 2022-05-11 ENCOUNTER — APPOINTMENT (OUTPATIENT)
Dept: ORTHOPEDIC SURGERY | Facility: CLINIC | Age: 32
End: 2022-05-11
Payer: MEDICAID

## 2022-05-11 DIAGNOSIS — M65.4 RADIAL STYLOID TENOSYNOVITIS [DE QUERVAIN]: ICD-10-CM

## 2022-05-11 PROCEDURE — 99024 POSTOP FOLLOW-UP VISIT: CPT

## 2022-06-06 NOTE — ASU PREOP CHECKLIST - ALLERGY BAND ON
done/Tylenol Rotation Flap Text: The defect edges were debeveled with a #15 scalpel blade.  With the objective of achieving maximal preservation of function and appearance, a rotation flap was deemed most appropriate.  Using a sterile surgical marker, an appropriate rotation flap was drawn incorporating the defect and placing the expected incisions within the relaxed skin tension lines where possible.    The area thus outlined was incised with a #15 scalpel blade.  The flap was elevated and skin margins were undermined to an appropriate distance in all directions.  After obtaining complete hemostasis, the flap was rotated into position.

## 2023-01-23 ENCOUNTER — APPOINTMENT (OUTPATIENT)
Dept: OBGYN | Facility: CLINIC | Age: 33
End: 2023-01-23
Payer: MEDICAID

## 2023-01-23 ENCOUNTER — ASOB RESULT (OUTPATIENT)
Age: 33
End: 2023-01-23

## 2023-01-23 VITALS
SYSTOLIC BLOOD PRESSURE: 102 MMHG | DIASTOLIC BLOOD PRESSURE: 67 MMHG | WEIGHT: 129 LBS | HEART RATE: 66 BPM | HEIGHT: 61 IN | BODY MASS INDEX: 24.35 KG/M2

## 2023-01-23 DIAGNOSIS — Z34.03 ENCOUNTER FOR SUPERVISION OF NORMAL FIRST PREGNANCY, THIRD TRIMESTER: ICD-10-CM

## 2023-01-23 DIAGNOSIS — Z34.02 ENCOUNTER FOR SUPERVISION OF NORMAL FIRST PREGNANCY, SECOND TRIMESTER: ICD-10-CM

## 2023-01-23 DIAGNOSIS — O36.80X0 PREGNANCY WITH INCONCLUSIVE FETAL VIABILITY, NOT APPLICABLE OR UNSPECIFIED: ICD-10-CM

## 2023-01-23 PROCEDURE — 99214 OFFICE O/P EST MOD 30 MIN: CPT

## 2023-01-23 PROCEDURE — 76817 TRANSVAGINAL US OBSTETRIC: CPT

## 2023-01-23 RX ORDER — ERGOCALCIFEROL (VITAMIN D2) 10 MCG
TABLET ORAL
Refills: 0 | Status: COMPLETED | COMMUNITY
End: 2023-01-23

## 2023-01-30 PROBLEM — Z34.02 ENCOUNTER FOR SUPERVISION OF LOW-RISK FIRST PREGNANCY IN SECOND TRIMESTER: Status: RESOLVED | Noted: 2021-05-20 | Resolved: 2023-01-30

## 2023-01-30 PROBLEM — Z34.03 ENCOUNTER FOR PRENATAL CARE OF FIRST PREGNANCY, ANTEPARTUM, THIRD TRIMESTER: Status: RESOLVED | Noted: 2021-09-09 | Resolved: 2023-01-30

## 2023-01-30 LAB
BACTERIA UR CULT: NORMAL
C TRACH RRNA SPEC QL NAA+PROBE: NOT DETECTED
CYTOLOGY CVX/VAG DOC THIN PREP: NORMAL
HPV HIGH+LOW RISK DNA PNL CVX: NOT DETECTED
N GONORRHOEA RRNA SPEC QL NAA+PROBE: NOT DETECTED
SOURCE AMPLIFICATION: NORMAL

## 2023-01-30 RX ORDER — PRENATAL VIT 49/IRON FUM/FOLIC 6.75-0.2MG
TABLET ORAL
Refills: 0 | Status: ACTIVE | COMMUNITY

## 2023-01-30 NOTE — COUNSELING
[Nutrition/ Exercise/ Weight Management] : nutrition, exercise, weight management [Vitamins/Supplements] : vitamins/supplements [Drugs/Alcohol] : drugs, alcohol [Breast Self Exam] : breast self exam [Confidentiality] : confidentiality [STD (testing, results, tx)] : STD (testing, results, tx) [Vaccines] : vaccines [Influenza Vaccine] : influenza vaccine [Lab Results] : lab results [Medication Management] : medication management

## 2023-01-30 NOTE — DISCUSSION/SUMMARY
[FreeTextEntry1] : 33 y/o female presents for confirmation of pregnancy. Discussed nuchal translucency, NIPS, sono appts and basic prenatal care routines. RTO on 23\par Patient given practice letter and [facility] pregnancy brochure. The groups support staff and coverage arrangement discussed. Reviewed prenatal screening including amnio, CVS, NIPS, and nuchal.  Went over basic prenatal care routines, nutrition, and physical activity. \par  ACOG pamphlet on vaginal birth after  section given to patient as well as office consent forms. Briefly reviewed pros and cons for both procedures.

## 2023-01-30 NOTE — HISTORY OF PRESENT ILLNESS
[Patient reported PAP Smear was normal] : Patient reported PAP Smear was normal [FreeTextEntry1] : 33 y/o female presents for a follow up visit for confirmation of pregnancy. \par LMP 12/5/22\par \par EDC: 9/11/23\par \par Past Pregnancies: \par C/S: 11/21 10 cm dilated, 8lb, F, lIJ, arrest of descent\par  [PapSmeardate] : 3/2021

## 2023-02-22 ENCOUNTER — NON-APPOINTMENT (OUTPATIENT)
Age: 33
End: 2023-02-22

## 2023-02-22 ENCOUNTER — APPOINTMENT (OUTPATIENT)
Dept: OBGYN | Facility: CLINIC | Age: 33
End: 2023-02-22
Payer: MEDICAID

## 2023-02-22 VITALS
HEIGHT: 61 IN | WEIGHT: 128 LBS | DIASTOLIC BLOOD PRESSURE: 75 MMHG | BODY MASS INDEX: 24.17 KG/M2 | SYSTOLIC BLOOD PRESSURE: 113 MMHG

## 2023-02-22 DIAGNOSIS — Z34.91 ENCOUNTER FOR SUPERVISION OF NORMAL PREGNANCY, UNSPECIFIED, FIRST TRIMESTER: ICD-10-CM

## 2023-02-22 PROCEDURE — 0502F SUBSEQUENT PRENATAL CARE: CPT

## 2023-03-01 ENCOUNTER — APPOINTMENT (OUTPATIENT)
Dept: OBGYN | Facility: CLINIC | Age: 33
End: 2023-03-01
Payer: MEDICAID

## 2023-03-01 ENCOUNTER — ASOB RESULT (OUTPATIENT)
Age: 33
End: 2023-03-01

## 2023-03-01 ENCOUNTER — APPOINTMENT (OUTPATIENT)
Dept: ANTEPARTUM | Facility: CLINIC | Age: 33
End: 2023-03-01
Payer: MEDICAID

## 2023-03-01 PROCEDURE — 76801 OB US < 14 WKS SINGLE FETUS: CPT

## 2023-03-01 PROCEDURE — 76813 OB US NUCHAL MEAS 1 GEST: CPT

## 2023-03-01 PROCEDURE — 99211 OFF/OP EST MAY X REQ PHY/QHP: CPT

## 2023-03-03 LAB
ABO + RH PNL BLD: NORMAL
ALBUMIN SERPL ELPH-MCNC: 4.4 G/DL
ALP BLD-CCNC: 65 U/L
ALT SERPL-CCNC: 7 U/L
ANION GAP SERPL CALC-SCNC: 11 MMOL/L
AST SERPL-CCNC: 12 U/L
BASOPHILS # BLD AUTO: 0.03 K/UL
BASOPHILS NFR BLD AUTO: 0.6 %
BILIRUB SERPL-MCNC: 0.3 MG/DL
BLD GP AB SCN SERPL QL: NORMAL
BUN SERPL-MCNC: 8 MG/DL
CALCIUM SERPL-MCNC: 9.5 MG/DL
CHLORIDE SERPL-SCNC: 104 MMOL/L
CO2 SERPL-SCNC: 22 MMOL/L
CREAT SERPL-MCNC: 0.51 MG/DL
EGFR: 129 ML/MIN/1.73M2
EOSINOPHIL # BLD AUTO: 0.08 K/UL
EOSINOPHIL NFR BLD AUTO: 1.5 %
ESTIMATED AVERAGE GLUCOSE: 111 MG/DL
GLUCOSE SERPL-MCNC: 87 MG/DL
HBA1C MFR BLD HPLC: 5.5 %
HBV SURFACE AG SER QL: NONREACTIVE
HCT VFR BLD CALC: 36.4 %
HCV AB SER QL: NONREACTIVE
HCV S/CO RATIO: 0.05 S/CO
HGB A MFR BLD: 97.1 %
HGB A2 MFR BLD: 2.9 %
HGB BLD-MCNC: 12 G/DL
HGB FRACT BLD-IMP: NORMAL
HIV1+2 AB SPEC QL IA.RAPID: NONREACTIVE
IMM GRANULOCYTES NFR BLD AUTO: 0.2 %
LEAD BLD-MCNC: <1 UG/DL
LYMPHOCYTES # BLD AUTO: 1.25 K/UL
LYMPHOCYTES NFR BLD AUTO: 23.3 %
MAN DIFF?: NORMAL
MCHC RBC-ENTMCNC: 28.3 PG
MCHC RBC-ENTMCNC: 33 GM/DL
MCV RBC AUTO: 85.8 FL
MEV IGG FLD QL IA: 179 AU/ML
MEV IGG+IGM SER-IMP: POSITIVE
MONOCYTES # BLD AUTO: 0.26 K/UL
MONOCYTES NFR BLD AUTO: 4.9 %
NEUTROPHILS # BLD AUTO: 3.73 K/UL
NEUTROPHILS NFR BLD AUTO: 69.5 %
PLATELET # BLD AUTO: 311 K/UL
POTASSIUM SERPL-SCNC: 4.1 MMOL/L
PROT SERPL-MCNC: 6.8 G/DL
RBC # BLD: 4.24 M/UL
RBC # FLD: 12.6 %
RUBV IGG FLD-ACNC: 0.8 INDEX
RUBV IGG SER-IMP: NEGATIVE
SODIUM SERPL-SCNC: 138 MMOL/L
T GONDII AB SER-IMP: NEGATIVE
T GONDII AB SER-IMP: NEGATIVE
T GONDII IGG SER QL: <3 IU/ML
T GONDII IGM SER QL: <3 AU/ML
T PALLIDUM AB SER QL IA: NEGATIVE
TSH SERPL-ACNC: 1.4 UIU/ML
VZV AB TITR SER: NEGATIVE
VZV IGG SER IF-ACNC: 80.2 INDEX
WBC # FLD AUTO: 5.36 K/UL

## 2023-03-05 LAB
B19V IGG SER QL IA: 7.29 INDEX
B19V IGG+IGM SER-IMP: NORMAL
B19V IGG+IGM SER-IMP: POSITIVE
B19V IGM FLD-ACNC: 0.12 INDEX
B19V IGM SER-ACNC: NEGATIVE
M TB IFN-G BLD-IMP: NEGATIVE
QUANTIFERON TB PLUS MITOGEN MINUS NIL: 6.56 IU/ML
QUANTIFERON TB PLUS NIL: 0.02 IU/ML
QUANTIFERON TB PLUS TB1 MINUS NIL: 0 IU/ML
QUANTIFERON TB PLUS TB2 MINUS NIL: -0.01 IU/ML

## 2023-03-21 ENCOUNTER — NON-APPOINTMENT (OUTPATIENT)
Age: 33
End: 2023-03-21

## 2023-03-21 NOTE — OB RN PATIENT PROFILE - PATIENT ON (OXYGEN DELIVERY METHOD)
Pt arrives via walk in c/o right ankle pain.  Pt stated, \"I was running late so I jumped over a train and landed.  But I went to work and then the pain increased.  I fractured this ankle before so yeah.  This hurts a lot.\"  Pt appears to be in discomfort.  10/10 pain.  Swelling noted to right ankle laterally.  
room air

## 2023-04-24 ENCOUNTER — NON-APPOINTMENT (OUTPATIENT)
Age: 33
End: 2023-04-24

## 2023-04-24 ENCOUNTER — ASOB RESULT (OUTPATIENT)
Age: 33
End: 2023-04-24

## 2023-04-24 ENCOUNTER — APPOINTMENT (OUTPATIENT)
Dept: ANTEPARTUM | Facility: CLINIC | Age: 33
End: 2023-04-24
Payer: MEDICAID

## 2023-04-24 PROCEDURE — 76811 OB US DETAILED SNGL FETUS: CPT

## 2023-04-26 ENCOUNTER — APPOINTMENT (OUTPATIENT)
Dept: OBGYN | Facility: CLINIC | Age: 33
End: 2023-04-26
Payer: MEDICAID

## 2023-04-26 VITALS
WEIGHT: 133.6 LBS | HEART RATE: 90 BPM | SYSTOLIC BLOOD PRESSURE: 112 MMHG | BODY MASS INDEX: 25.22 KG/M2 | DIASTOLIC BLOOD PRESSURE: 72 MMHG | HEIGHT: 61 IN

## 2023-04-26 PROCEDURE — 0502F SUBSEQUENT PRENATAL CARE: CPT

## 2023-05-03 LAB
AFP MOM: 1.23
AFP VALUE: 81 NG/ML
ALPHA FETOPROTEIN SERUM COMMENT: NORMAL
ALPHA FETOPROTEIN SERUM INTERPRETATION: NORMAL
ALPHA FETOPROTEIN SERUM RESULTS: NORMAL
ALPHA FETOPROTEIN SERUM TEST RESULTS: NORMAL
GESTATIONAL AGE BASED ON: NORMAL
GESTATIONAL AGE ON COLLECTION DATE: 20.3 WEEKS
INSULIN DEP DIABETES: NO
MATERNAL AGE AT EDD AFP: 30.9 YR
MULTIPLE GESTATION: NO
OSBR RISK 1 IN: 5926
RACE: NORMAL
WEIGHT AFP: 133 LBS

## 2023-05-22 ENCOUNTER — NON-APPOINTMENT (OUTPATIENT)
Age: 33
End: 2023-05-22

## 2023-05-22 ENCOUNTER — APPOINTMENT (OUTPATIENT)
Dept: OBGYN | Facility: CLINIC | Age: 33
End: 2023-05-22
Payer: MEDICAID

## 2023-05-22 VITALS
HEIGHT: 61 IN | DIASTOLIC BLOOD PRESSURE: 73 MMHG | HEART RATE: 83 BPM | WEIGHT: 139 LBS | SYSTOLIC BLOOD PRESSURE: 122 MMHG | BODY MASS INDEX: 26.24 KG/M2

## 2023-05-22 DIAGNOSIS — Z34.92 ENCOUNTER FOR SUPERVISION OF NORMAL PREGNANCY, UNSPECIFIED, SECOND TRIMESTER: ICD-10-CM

## 2023-05-22 PROCEDURE — 0502F SUBSEQUENT PRENATAL CARE: CPT

## 2023-05-23 DIAGNOSIS — R73.09 OTHER ABNORMAL GLUCOSE: ICD-10-CM

## 2023-05-23 LAB
GLUCOSE 1H P 100 G GLC PO SERPL-MCNC: 138 MG/DL
T PALLIDUM AB SER QL IA: NEGATIVE

## 2023-06-06 ENCOUNTER — NON-APPOINTMENT (OUTPATIENT)
Age: 33
End: 2023-06-06

## 2023-06-22 ENCOUNTER — APPOINTMENT (OUTPATIENT)
Dept: OBGYN | Facility: CLINIC | Age: 33
End: 2023-06-22
Payer: MEDICAID

## 2023-06-22 ENCOUNTER — NON-APPOINTMENT (OUTPATIENT)
Age: 33
End: 2023-06-22

## 2023-06-22 VITALS
HEART RATE: 91 BPM | SYSTOLIC BLOOD PRESSURE: 101 MMHG | DIASTOLIC BLOOD PRESSURE: 62 MMHG | WEIGHT: 144 LBS | BODY MASS INDEX: 27.19 KG/M2 | HEIGHT: 61 IN

## 2023-06-22 PROCEDURE — 0502F SUBSEQUENT PRENATAL CARE: CPT

## 2023-07-13 ENCOUNTER — APPOINTMENT (OUTPATIENT)
Dept: OBGYN | Facility: CLINIC | Age: 33
End: 2023-07-13
Payer: MEDICAID

## 2023-07-13 VITALS
SYSTOLIC BLOOD PRESSURE: 97 MMHG | WEIGHT: 147 LBS | DIASTOLIC BLOOD PRESSURE: 91 MMHG | BODY MASS INDEX: 27.75 KG/M2 | HEIGHT: 61 IN

## 2023-07-13 PROCEDURE — 90715 TDAP VACCINE 7 YRS/> IM: CPT

## 2023-07-13 PROCEDURE — 0502F SUBSEQUENT PRENATAL CARE: CPT

## 2023-07-13 PROCEDURE — 90471 IMMUNIZATION ADMIN: CPT

## 2023-07-27 ENCOUNTER — ASOB RESULT (OUTPATIENT)
Age: 33
End: 2023-07-27

## 2023-07-27 ENCOUNTER — APPOINTMENT (OUTPATIENT)
Dept: OBGYN | Facility: CLINIC | Age: 33
End: 2023-07-27
Payer: MEDICAID

## 2023-07-27 ENCOUNTER — APPOINTMENT (OUTPATIENT)
Dept: ANTEPARTUM | Facility: CLINIC | Age: 33
End: 2023-07-27
Payer: MEDICAID

## 2023-07-27 VITALS
BODY MASS INDEX: 33.51 KG/M2 | HEIGHT: 55 IN | WEIGHT: 144.8 LBS | DIASTOLIC BLOOD PRESSURE: 62 MMHG | HEART RATE: 85 BPM | SYSTOLIC BLOOD PRESSURE: 92 MMHG

## 2023-07-27 PROCEDURE — 76816 OB US FOLLOW-UP PER FETUS: CPT

## 2023-07-27 PROCEDURE — 76819 FETAL BIOPHYS PROFIL W/O NST: CPT | Mod: 59

## 2023-07-27 PROCEDURE — XXXXX: CPT | Mod: 1L

## 2023-08-17 ENCOUNTER — APPOINTMENT (OUTPATIENT)
Dept: OBGYN | Facility: CLINIC | Age: 33
End: 2023-08-17
Payer: MEDICAID

## 2023-08-17 VITALS
BODY MASS INDEX: 34.71 KG/M2 | SYSTOLIC BLOOD PRESSURE: 104 MMHG | HEART RATE: 106 BPM | DIASTOLIC BLOOD PRESSURE: 68 MMHG | WEIGHT: 150 LBS | HEIGHT: 55 IN

## 2023-08-17 PROCEDURE — 0502F SUBSEQUENT PRENATAL CARE: CPT

## 2023-08-21 LAB
GP B STREP DNA SPEC QL NAA+PROBE: NOT DETECTED
HIV1+2 AB SPEC QL IA.RAPID: NONREACTIVE
SOURCE GBS: NORMAL

## 2023-08-24 ENCOUNTER — NON-APPOINTMENT (OUTPATIENT)
Age: 33
End: 2023-08-24

## 2023-08-24 ENCOUNTER — APPOINTMENT (OUTPATIENT)
Dept: OBGYN | Facility: CLINIC | Age: 33
End: 2023-08-24
Payer: MEDICAID

## 2023-08-24 VITALS
SYSTOLIC BLOOD PRESSURE: 105 MMHG | HEART RATE: 96 BPM | BODY MASS INDEX: 35.18 KG/M2 | DIASTOLIC BLOOD PRESSURE: 66 MMHG | WEIGHT: 152 LBS | HEIGHT: 55 IN

## 2023-08-24 DIAGNOSIS — Z34.93 ENCOUNTER FOR SUPERVISION OF NORMAL PREGNANCY, UNSPECIFIED, THIRD TRIMESTER: ICD-10-CM

## 2023-08-24 PROCEDURE — 0501F PRENATAL FLOW SHEET: CPT
